# Patient Record
Sex: FEMALE | Race: WHITE | NOT HISPANIC OR LATINO | Employment: FULL TIME | RURAL
[De-identification: names, ages, dates, MRNs, and addresses within clinical notes are randomized per-mention and may not be internally consistent; named-entity substitution may affect disease eponyms.]

---

## 2021-09-09 ENCOUNTER — LAB REQUISITION (OUTPATIENT)
Dept: LAB | Facility: HOSPITAL | Age: 61
End: 2021-09-09
Payer: COMMERCIAL

## 2021-09-09 DIAGNOSIS — Z11.52 ENCOUNTER FOR SCREENING FOR COVID-19: ICD-10-CM

## 2021-09-09 LAB
FLUAV AG UPPER RESP QL IA.RAPID: NEGATIVE
FLUBV AG UPPER RESP QL IA.RAPID: NEGATIVE
SARS-COV+SARS-COV-2 AG RESP QL IA.RAPID: NEGATIVE

## 2021-09-09 PROCEDURE — 87428 SARSCOV & INF VIR A&B AG IA: CPT

## 2021-09-27 ENCOUNTER — CLINICAL SUPPORT (OUTPATIENT)
Dept: PRIMARY CARE CLINIC | Facility: CLINIC | Age: 61
End: 2021-09-27
Payer: COMMERCIAL

## 2021-09-27 DIAGNOSIS — E03.9 HYPOTHYROIDISM, UNSPECIFIED TYPE: Primary | ICD-10-CM

## 2021-09-27 DIAGNOSIS — F32.A DEPRESSION, UNSPECIFIED DEPRESSION TYPE: Primary | ICD-10-CM

## 2021-09-27 RX ORDER — LEVOTHYROXINE SODIUM 100 UG/1
100 TABLET ORAL EVERY MORNING
COMMUNITY
Start: 2021-08-25 | End: 2021-09-27 | Stop reason: SDUPTHER

## 2021-09-27 RX ORDER — FLUOXETINE HYDROCHLORIDE 20 MG/1
20 CAPSULE ORAL EVERY MORNING
COMMUNITY
Start: 2021-08-25 | End: 2021-09-27 | Stop reason: SDUPTHER

## 2021-09-27 RX ORDER — LEVOTHYROXINE SODIUM 100 UG/1
100 TABLET ORAL EVERY MORNING
Qty: 90 TABLET | Refills: 2 | Status: SHIPPED | OUTPATIENT
Start: 2021-09-27 | End: 2022-06-29

## 2021-09-28 RX ORDER — FLUOXETINE HYDROCHLORIDE 20 MG/1
20 CAPSULE ORAL EVERY MORNING
Qty: 90 CAPSULE | Refills: 2 | Status: SHIPPED | OUTPATIENT
Start: 2021-09-28 | End: 2022-06-29

## 2022-04-24 ENCOUNTER — HOSPITAL ENCOUNTER (EMERGENCY)
Facility: HOSPITAL | Age: 62
Discharge: HOME | End: 2022-04-24
Attending: SPECIALIST
Payer: COMMERCIAL

## 2022-04-24 VITALS
RESPIRATION RATE: 20 BRPM | TEMPERATURE: 98 F | HEART RATE: 77 BPM | WEIGHT: 170 LBS | HEIGHT: 65 IN | OXYGEN SATURATION: 97 % | SYSTOLIC BLOOD PRESSURE: 147 MMHG | BODY MASS INDEX: 28.32 KG/M2 | DIASTOLIC BLOOD PRESSURE: 84 MMHG

## 2022-04-24 DIAGNOSIS — S39.012A STRAIN OF LUMBAR REGION, INITIAL ENCOUNTER: Primary | ICD-10-CM

## 2022-04-24 PROCEDURE — 63600175 PHARM REV CODE 636 W HCPCS: Performed by: SPECIALIST

## 2022-04-24 PROCEDURE — 99284 EMERGENCY DEPT VISIT MOD MDM: CPT

## 2022-04-24 PROCEDURE — 99999 HC NO LEVEL OF SERVICE - ED ONLY: CPT

## 2022-04-24 PROCEDURE — 96372 THER/PROPH/DIAG INJ SC/IM: CPT

## 2022-04-24 RX ORDER — KETOROLAC TROMETHAMINE 30 MG/ML
60 INJECTION, SOLUTION INTRAMUSCULAR; INTRAVENOUS
Status: COMPLETED | OUTPATIENT
Start: 2022-04-24 | End: 2022-04-24

## 2022-04-24 RX ORDER — KETOROLAC TROMETHAMINE 30 MG/ML
30 INJECTION, SOLUTION INTRAMUSCULAR; INTRAVENOUS
Status: DISCONTINUED | OUTPATIENT
Start: 2022-04-24 | End: 2022-04-24

## 2022-04-24 RX ADMIN — KETOROLAC TROMETHAMINE 60 MG: 30 INJECTION, SOLUTION INTRAMUSCULAR at 04:04

## 2022-04-24 NOTE — DISCHARGE INSTRUCTIONS
Tiger Balm patches to low back OR warm heat to low back area   Ibuprofen 800 mg every 6 hrs 12 - 24 hrs after Toradol shot only
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
## 2022-04-25 ENCOUNTER — TELEPHONE (OUTPATIENT)
Dept: EMERGENCY MEDICINE | Facility: HOSPITAL | Age: 62
End: 2022-04-25
Payer: COMMERCIAL

## 2022-06-22 ENCOUNTER — TELEPHONE (OUTPATIENT)
Dept: PRIMARY CARE CLINIC | Facility: CLINIC | Age: 62
End: 2022-06-22
Payer: COMMERCIAL

## 2022-06-22 NOTE — TELEPHONE ENCOUNTER
----- Message from Ashley Kovacs sent at 6/21/2022  1:22 PM CDT -----  Regarding: Rx Refill  Request rx for levothyroxine and prozac called to [facility]

## 2022-10-04 ENCOUNTER — OFFICE VISIT (OUTPATIENT)
Dept: PRIMARY CARE CLINIC | Facility: CLINIC | Age: 62
End: 2022-10-04
Payer: COMMERCIAL

## 2022-10-04 ENCOUNTER — TELEPHONE (OUTPATIENT)
Dept: PRIMARY CARE CLINIC | Facility: CLINIC | Age: 62
End: 2022-10-04
Payer: COMMERCIAL

## 2022-10-04 VITALS
HEIGHT: 65 IN | BODY MASS INDEX: 29.99 KG/M2 | DIASTOLIC BLOOD PRESSURE: 72 MMHG | OXYGEN SATURATION: 95 % | HEART RATE: 93 BPM | WEIGHT: 180 LBS | TEMPERATURE: 98 F | RESPIRATION RATE: 20 BRPM | SYSTOLIC BLOOD PRESSURE: 136 MMHG

## 2022-10-04 DIAGNOSIS — Z13.220 ENCOUNTER FOR SCREENING FOR LIPID DISORDER: ICD-10-CM

## 2022-10-04 DIAGNOSIS — F32.A DEPRESSION, UNSPECIFIED DEPRESSION TYPE: ICD-10-CM

## 2022-10-04 DIAGNOSIS — Z79.899 ENCOUNTER FOR LONG-TERM (CURRENT) USE OF MEDICATIONS: ICD-10-CM

## 2022-10-04 DIAGNOSIS — E03.9 HYPOTHYROIDISM, UNSPECIFIED TYPE: Primary | ICD-10-CM

## 2022-10-04 LAB
ALBUMIN SERPL BCP-MCNC: 3.6 G/DL (ref 3.5–5)
ALBUMIN/GLOB SERPL: 1 {RATIO}
ALP SERPL-CCNC: 82 U/L (ref 50–130)
ALT SERPL W P-5'-P-CCNC: 35 U/L (ref 13–56)
ANION GAP SERPL CALCULATED.3IONS-SCNC: 11 MMOL/L (ref 7–16)
AST SERPL W P-5'-P-CCNC: 26 U/L (ref 15–37)
BASOPHILS # BLD AUTO: 0.06 K/UL (ref 0–0.2)
BASOPHILS NFR BLD AUTO: 0.7 % (ref 0–1)
BILIRUB SERPL-MCNC: 0.6 MG/DL (ref ?–1.2)
BUN SERPL-MCNC: 9 MG/DL (ref 7–18)
BUN/CREAT SERPL: 7 (ref 6–20)
CALCIUM SERPL-MCNC: 9 MG/DL (ref 8.5–10.1)
CHLORIDE SERPL-SCNC: 105 MMOL/L (ref 98–107)
CHOLEST SERPL-MCNC: 190 MG/DL (ref 0–200)
CHOLEST/HDLC SERPL: 3.5 {RATIO}
CO2 SERPL-SCNC: 25 MMOL/L (ref 21–32)
CREAT SERPL-MCNC: 1.23 MG/DL (ref 0.55–1.02)
DIFFERENTIAL METHOD BLD: NORMAL
EGFR (NO RACE VARIABLE) (RUSH/TITUS): 50 ML/MIN/1.73M²
EOSINOPHIL # BLD AUTO: 0.19 K/UL (ref 0–0.5)
EOSINOPHIL NFR BLD AUTO: 2.1 % (ref 1–4)
ERYTHROCYTE [DISTWIDTH] IN BLOOD BY AUTOMATED COUNT: 13.2 % (ref 11.5–14.5)
GLOBULIN SER-MCNC: 3.5 G/DL (ref 2–4)
GLUCOSE SERPL-MCNC: 71 MG/DL (ref 74–106)
HCT VFR BLD AUTO: 43.9 % (ref 38–47)
HDLC SERPL-MCNC: 55 MG/DL (ref 40–60)
HGB BLD-MCNC: 14.3 G/DL (ref 12–16)
IMM GRANULOCYTES # BLD AUTO: 0.02 K/UL (ref 0–0.04)
IMM GRANULOCYTES NFR BLD: 0.2 % (ref 0–0.4)
LDLC SERPL CALC-MCNC: 111 MG/DL
LDLC/HDLC SERPL: 2 {RATIO}
LYMPHOCYTES # BLD AUTO: 2.84 K/UL (ref 1–4.8)
LYMPHOCYTES NFR BLD AUTO: 31 % (ref 27–41)
MCH RBC QN AUTO: 30.8 PG (ref 27–31)
MCHC RBC AUTO-ENTMCNC: 32.6 G/DL (ref 32–36)
MCV RBC AUTO: 94.4 FL (ref 80–96)
MONOCYTES # BLD AUTO: 0.53 K/UL (ref 0–0.8)
MONOCYTES NFR BLD AUTO: 5.8 % (ref 2–6)
MPC BLD CALC-MCNC: 11.3 FL (ref 9.4–12.4)
NEUTROPHILS # BLD AUTO: 5.51 K/UL (ref 1.8–7.7)
NEUTROPHILS NFR BLD AUTO: 60.2 % (ref 53–65)
NONHDLC SERPL-MCNC: 135 MG/DL
NRBC # BLD AUTO: 0 X10E3/UL
NRBC, AUTO (.00): 0 %
PLATELET # BLD AUTO: 342 K/UL (ref 150–400)
POTASSIUM SERPL-SCNC: 3.9 MMOL/L (ref 3.5–5.1)
PROT SERPL-MCNC: 7.1 G/DL (ref 6.4–8.2)
RBC # BLD AUTO: 4.65 M/UL (ref 4.2–5.4)
SODIUM SERPL-SCNC: 137 MMOL/L (ref 136–145)
TRIGL SERPL-MCNC: 121 MG/DL (ref 35–150)
TSH SERPL DL<=0.005 MIU/L-ACNC: 5.04 UIU/ML (ref 0.36–3.74)
VLDLC SERPL-MCNC: 24 MG/DL
WBC # BLD AUTO: 9.15 K/UL (ref 4.5–11)

## 2022-10-04 PROCEDURE — 1159F PR MEDICATION LIST DOCUMENTED IN MEDICAL RECORD: ICD-10-PCS | Mod: CPTII,,, | Performed by: FAMILY MEDICINE

## 2022-10-04 PROCEDURE — 3008F BODY MASS INDEX DOCD: CPT | Mod: CPTII,,, | Performed by: FAMILY MEDICINE

## 2022-10-04 PROCEDURE — 80061 LIPID PANEL: ICD-10-PCS | Mod: ,,, | Performed by: CLINICAL MEDICAL LABORATORY

## 2022-10-04 PROCEDURE — 80050 PR  GENERAL HEALTH PANEL: ICD-10-PCS | Mod: ,,, | Performed by: CLINICAL MEDICAL LABORATORY

## 2022-10-04 PROCEDURE — 3078F DIAST BP <80 MM HG: CPT | Mod: CPTII,,, | Performed by: FAMILY MEDICINE

## 2022-10-04 PROCEDURE — 3078F PR MOST RECENT DIASTOLIC BLOOD PRESSURE < 80 MM HG: ICD-10-PCS | Mod: CPTII,,, | Performed by: FAMILY MEDICINE

## 2022-10-04 PROCEDURE — 1159F MED LIST DOCD IN RCRD: CPT | Mod: CPTII,,, | Performed by: FAMILY MEDICINE

## 2022-10-04 PROCEDURE — 99214 OFFICE O/P EST MOD 30 MIN: CPT | Mod: ,,, | Performed by: FAMILY MEDICINE

## 2022-10-04 PROCEDURE — 3075F SYST BP GE 130 - 139MM HG: CPT | Mod: CPTII,,, | Performed by: FAMILY MEDICINE

## 2022-10-04 PROCEDURE — 3008F PR BODY MASS INDEX (BMI) DOCUMENTED: ICD-10-PCS | Mod: CPTII,,, | Performed by: FAMILY MEDICINE

## 2022-10-04 PROCEDURE — 3075F PR MOST RECENT SYSTOLIC BLOOD PRESS GE 130-139MM HG: ICD-10-PCS | Mod: CPTII,,, | Performed by: FAMILY MEDICINE

## 2022-10-04 PROCEDURE — 80050 GENERAL HEALTH PANEL: CPT | Mod: ,,, | Performed by: CLINICAL MEDICAL LABORATORY

## 2022-10-04 PROCEDURE — 80061 LIPID PANEL: CPT | Mod: ,,, | Performed by: CLINICAL MEDICAL LABORATORY

## 2022-10-04 PROCEDURE — 99214 PR OFFICE/OUTPT VISIT, EST, LEVL IV, 30-39 MIN: ICD-10-PCS | Mod: ,,, | Performed by: FAMILY MEDICINE

## 2022-10-04 RX ORDER — FLUOXETINE HYDROCHLORIDE 20 MG/1
20 CAPSULE ORAL EVERY MORNING
Qty: 90 CAPSULE | Refills: 3 | Status: SHIPPED | OUTPATIENT
Start: 2022-10-04 | End: 2023-10-24 | Stop reason: SDUPTHER

## 2022-10-04 RX ORDER — LEVOTHYROXINE SODIUM 100 UG/1
100 TABLET ORAL
Qty: 90 TABLET | Refills: 3 | Status: SHIPPED | OUTPATIENT
Start: 2022-10-04 | End: 2023-02-16 | Stop reason: DRUGHIGH

## 2022-10-04 NOTE — PROGRESS NOTES
Subjective:       Patient ID: Emily Gonzalez is a 61 y.o. female.    Chief Complaint: Hypothyroidism and Medication Refill    Doing well. No complaints voiced.     Medication Refill  Pertinent negatives include no chest pain, congestion, coughing, fatigue, fever, headaches, nausea or vomiting.   Review of Systems   Constitutional:  Negative for fatigue and fever.   HENT:  Negative for nasal congestion and dental problem.    Eyes:  Negative for discharge.   Respiratory:  Negative for cough and shortness of breath.    Cardiovascular:  Negative for chest pain.   Gastrointestinal:  Negative for constipation, diarrhea, nausea and vomiting.   Genitourinary:  Negative for bladder incontinence, difficulty urinating and hot flashes.   Allergic/Immunologic: Negative for environmental allergies.   Neurological:  Negative for headaches.   Psychiatric/Behavioral:  Negative for behavioral problems and confusion.        Objective:      Physical Exam  Vitals and nursing note reviewed.   Constitutional:       Appearance: Normal appearance. She is normal weight.   HENT:      Head: Normocephalic and atraumatic.      Right Ear: Tympanic membrane normal.      Left Ear: Tympanic membrane normal.      Nose: Nose normal.      Mouth/Throat:      Mouth: Mucous membranes are moist.   Eyes:      Extraocular Movements: Extraocular movements intact.      Conjunctiva/sclera: Conjunctivae normal.      Pupils: Pupils are equal, round, and reactive to light.   Cardiovascular:      Rate and Rhythm: Normal rate and regular rhythm.      Pulses: Normal pulses.   Pulmonary:      Effort: Pulmonary effort is normal.      Breath sounds: Normal breath sounds.   Abdominal:      General: Abdomen is flat. Bowel sounds are normal.      Palpations: Abdomen is soft.   Musculoskeletal:         General: Normal range of motion.      Cervical back: Normal range of motion and neck supple.   Skin:     General: Skin is warm and dry.   Neurological:      General: No focal  deficit present.      Mental Status: She is alert and oriented to person, place, and time.   Psychiatric:         Mood and Affect: Mood normal.       Assessment:       Problem List Items Addressed This Visit    None  Visit Diagnoses       Hypothyroidism, unspecified type    -  Primary    Relevant Medications    levothyroxine (SYNTHROID) 100 MCG tablet    Other Relevant Orders    TSH    Encounter for screening for lipid disorder        Relevant Orders    Lipid Panel    Encounter for long-term (current) use of medications        Relevant Orders    CBC Auto Differential    Comprehensive Metabolic Panel              Plan:       RTC as needed  Will call lab results

## 2022-10-04 NOTE — TELEPHONE ENCOUNTER
----- Message from Soni Tijerina sent at 10/3/2022 10:58 AM CDT -----  Regarding: Refill  Refill   Levothroxine  Ferosimide  [facility]

## 2022-10-07 ENCOUNTER — PATIENT MESSAGE (OUTPATIENT)
Dept: PRIMARY CARE CLINIC | Facility: CLINIC | Age: 62
End: 2022-10-07
Payer: COMMERCIAL

## 2022-10-07 ENCOUNTER — TELEPHONE (OUTPATIENT)
Dept: PRIMARY CARE CLINIC | Facility: CLINIC | Age: 62
End: 2022-10-07
Payer: COMMERCIAL

## 2022-10-07 NOTE — TELEPHONE ENCOUNTER
----- Message from Dilcia Waite MD sent at 10/5/2022  8:23 AM CDT -----  Please verify synthroid dose and increase it

## 2022-11-08 ENCOUNTER — INFUSION (OUTPATIENT)
Dept: INFUSION THERAPY | Facility: HOSPITAL | Age: 62
End: 2022-11-08
Attending: SPECIALIST
Payer: COMMERCIAL

## 2022-11-08 VITALS
OXYGEN SATURATION: 98 % | HEIGHT: 66 IN | DIASTOLIC BLOOD PRESSURE: 80 MMHG | BODY MASS INDEX: 28.12 KG/M2 | SYSTOLIC BLOOD PRESSURE: 135 MMHG | RESPIRATION RATE: 16 BRPM | TEMPERATURE: 98 F | WEIGHT: 175 LBS | HEART RATE: 80 BPM

## 2022-11-08 DIAGNOSIS — M54.50 LOW BACK PAIN, UNSPECIFIED BACK PAIN LATERALITY, UNSPECIFIED CHRONICITY, UNSPECIFIED WHETHER SCIATICA PRESENT: Primary | ICD-10-CM

## 2022-11-08 PROCEDURE — 63600175 PHARM REV CODE 636 W HCPCS: Performed by: SPECIALIST

## 2022-11-08 RX ORDER — DEXAMETHASONE SODIUM PHOSPHATE 4 MG/ML
4 INJECTION, SOLUTION INTRA-ARTICULAR; INTRALESIONAL; INTRAMUSCULAR; INTRAVENOUS; SOFT TISSUE ONCE
Status: COMPLETED | OUTPATIENT
Start: 2022-11-08 | End: 2022-11-08

## 2022-11-08 RX ORDER — KETOROLAC TROMETHAMINE 30 MG/ML
60 INJECTION, SOLUTION INTRAMUSCULAR; INTRAVENOUS ONCE
Status: COMPLETED | OUTPATIENT
Start: 2022-11-08 | End: 2022-11-08

## 2022-11-08 RX ADMIN — DEXAMETHASONE SODIUM PHOSPHATE 4 MG: 4 INJECTION, SOLUTION INTRAMUSCULAR; INTRAVENOUS at 08:11

## 2022-11-08 RX ADMIN — KETOROLAC TROMETHAMINE 60 MG: 30 INJECTION, SOLUTION INTRAMUSCULAR at 08:11

## 2023-02-16 RX ORDER — LEVOTHYROXINE SODIUM 125 UG/1
125 TABLET ORAL
Qty: 60 TABLET | Refills: 0 | Status: SHIPPED | OUTPATIENT
Start: 2023-02-16 | End: 2023-04-17

## 2023-02-16 RX ORDER — LEVOTHYROXINE SODIUM 125 UG/1
125 TABLET ORAL
COMMUNITY
End: 2023-02-16 | Stop reason: DRUGHIGH

## 2023-03-09 ENCOUNTER — PATIENT OUTREACH (OUTPATIENT)
Dept: PRIMARY CARE CLINIC | Facility: CLINIC | Age: 63
End: 2023-03-09
Payer: COMMERCIAL

## 2023-03-09 LAB — BCS RECOMMENDATION EXT: NORMAL

## 2023-04-03 ENCOUNTER — OFFICE VISIT (OUTPATIENT)
Dept: PRIMARY CARE CLINIC | Facility: CLINIC | Age: 63
End: 2023-04-03
Payer: COMMERCIAL

## 2023-04-03 VITALS
HEART RATE: 86 BPM | TEMPERATURE: 98 F | OXYGEN SATURATION: 96 % | WEIGHT: 186 LBS | SYSTOLIC BLOOD PRESSURE: 130 MMHG | HEIGHT: 66 IN | DIASTOLIC BLOOD PRESSURE: 84 MMHG | BODY MASS INDEX: 29.89 KG/M2

## 2023-04-03 DIAGNOSIS — M54.50 ACUTE LEFT-SIDED LOW BACK PAIN, UNSPECIFIED WHETHER SCIATICA PRESENT: Primary | ICD-10-CM

## 2023-04-03 DIAGNOSIS — M54.9 DORSALGIA, UNSPECIFIED: ICD-10-CM

## 2023-04-03 DIAGNOSIS — M54.42 ACUTE LEFT-SIDED LOW BACK PAIN WITH LEFT-SIDED SCIATICA: ICD-10-CM

## 2023-04-03 PROCEDURE — 3008F PR BODY MASS INDEX (BMI) DOCUMENTED: ICD-10-PCS | Mod: CPTII,,, | Performed by: FAMILY MEDICINE

## 2023-04-03 PROCEDURE — 96372 THER/PROPH/DIAG INJ SC/IM: CPT | Mod: ,,, | Performed by: FAMILY MEDICINE

## 2023-04-03 PROCEDURE — 1159F MED LIST DOCD IN RCRD: CPT | Mod: CPTII,,, | Performed by: FAMILY MEDICINE

## 2023-04-03 PROCEDURE — 1160F PR REVIEW ALL MEDS BY PRESCRIBER/CLIN PHARMACIST DOCUMENTED: ICD-10-PCS | Mod: CPTII,,, | Performed by: FAMILY MEDICINE

## 2023-04-03 PROCEDURE — 3075F SYST BP GE 130 - 139MM HG: CPT | Mod: CPTII,,, | Performed by: FAMILY MEDICINE

## 2023-04-03 PROCEDURE — 3008F BODY MASS INDEX DOCD: CPT | Mod: CPTII,,, | Performed by: FAMILY MEDICINE

## 2023-04-03 PROCEDURE — 3079F DIAST BP 80-89 MM HG: CPT | Mod: CPTII,,, | Performed by: FAMILY MEDICINE

## 2023-04-03 PROCEDURE — 99213 PR OFFICE/OUTPT VISIT, EST, LEVL III, 20-29 MIN: ICD-10-PCS | Mod: 25,,, | Performed by: FAMILY MEDICINE

## 2023-04-03 PROCEDURE — 3079F PR MOST RECENT DIASTOLIC BLOOD PRESSURE 80-89 MM HG: ICD-10-PCS | Mod: CPTII,,, | Performed by: FAMILY MEDICINE

## 2023-04-03 PROCEDURE — 96372 PR INJECTION,THERAP/PROPH/DIAG2ST, IM OR SUBCUT: ICD-10-PCS | Mod: ,,, | Performed by: FAMILY MEDICINE

## 2023-04-03 PROCEDURE — 1160F RVW MEDS BY RX/DR IN RCRD: CPT | Mod: CPTII,,, | Performed by: FAMILY MEDICINE

## 2023-04-03 PROCEDURE — 1159F PR MEDICATION LIST DOCUMENTED IN MEDICAL RECORD: ICD-10-PCS | Mod: CPTII,,, | Performed by: FAMILY MEDICINE

## 2023-04-03 PROCEDURE — 3075F PR MOST RECENT SYSTOLIC BLOOD PRESS GE 130-139MM HG: ICD-10-PCS | Mod: CPTII,,, | Performed by: FAMILY MEDICINE

## 2023-04-03 PROCEDURE — 99213 OFFICE O/P EST LOW 20 MIN: CPT | Mod: 25,,, | Performed by: FAMILY MEDICINE

## 2023-04-03 RX ORDER — METHOCARBAMOL 500 MG/1
500 TABLET, FILM COATED ORAL 4 TIMES DAILY
Qty: 40 TABLET | Refills: 0 | Status: SHIPPED | OUTPATIENT
Start: 2023-04-03 | End: 2023-04-13

## 2023-04-03 RX ORDER — DEXAMETHASONE SODIUM PHOSPHATE 4 MG/ML
4 INJECTION, SOLUTION INTRA-ARTICULAR; INTRALESIONAL; INTRAMUSCULAR; INTRAVENOUS; SOFT TISSUE
Status: COMPLETED | OUTPATIENT
Start: 2023-04-03 | End: 2023-04-03

## 2023-04-03 RX ORDER — IBUPROFEN 800 MG/1
800 TABLET ORAL 3 TIMES DAILY
Qty: 90 TABLET | Refills: 2 | Status: SHIPPED | OUTPATIENT
Start: 2023-04-03 | End: 2023-10-24 | Stop reason: SDUPTHER

## 2023-04-03 RX ORDER — METHYLPREDNISOLONE ACETATE 80 MG/ML
80 INJECTION, SUSPENSION INTRA-ARTICULAR; INTRALESIONAL; INTRAMUSCULAR; SOFT TISSUE
Status: COMPLETED | OUTPATIENT
Start: 2023-04-03 | End: 2023-04-03

## 2023-04-03 RX ORDER — KETOROLAC TROMETHAMINE 30 MG/ML
60 INJECTION, SOLUTION INTRAMUSCULAR; INTRAVENOUS
Status: COMPLETED | OUTPATIENT
Start: 2023-04-03 | End: 2023-04-03

## 2023-04-03 RX ORDER — PROGESTERONE 200 MG/1
200 CAPSULE ORAL NIGHTLY
COMMUNITY
Start: 2023-03-21

## 2023-04-03 RX ADMIN — DEXAMETHASONE SODIUM PHOSPHATE 4 MG: 4 INJECTION, SOLUTION INTRA-ARTICULAR; INTRALESIONAL; INTRAMUSCULAR; INTRAVENOUS; SOFT TISSUE at 03:04

## 2023-04-03 RX ADMIN — KETOROLAC TROMETHAMINE 60 MG: 30 INJECTION, SOLUTION INTRAMUSCULAR; INTRAVENOUS at 03:04

## 2023-04-03 RX ADMIN — METHYLPREDNISOLONE ACETATE 80 MG: 80 INJECTION, SUSPENSION INTRA-ARTICULAR; INTRALESIONAL; INTRAMUSCULAR; SOFT TISSUE at 03:04

## 2023-04-03 NOTE — PROGRESS NOTES
Subjective     Patient ID: Emily Gonzalez is a 62 y.o. female.    Chief Complaint: Back Pain (Severe low back and left hip pain ongoing for 3 weeks or more. Hurt to stand or sit. Tried ibuprofen,biofreeze,heating pad and tiger balm patches no relief.)    Pt. With low back pain with left sciatic radiation. Pain has been severe. Her  has to help her up. She cannot clean herself in the bathroom without pain. Has been stretching and using OTC meds to no avail. She has never had anything like this.    Back Pain  Pertinent negatives include no bladder incontinence, chest pain, fever or headaches.   Review of Systems   Constitutional:  Negative for fatigue and fever.   HENT:  Negative for nasal congestion and dental problem.    Eyes:  Negative for discharge.   Respiratory:  Negative for cough and shortness of breath.    Cardiovascular:  Negative for chest pain.   Gastrointestinal:  Negative for constipation, diarrhea, nausea and vomiting.   Genitourinary:  Negative for bladder incontinence, difficulty urinating and hot flashes.   Musculoskeletal:  Positive for back pain.   Allergic/Immunologic: Negative for environmental allergies.   Neurological:  Negative for headaches.   Psychiatric/Behavioral:  Negative for behavioral problems and confusion.         Objective     Physical Exam  Vitals and nursing note reviewed.   Constitutional:       Appearance: Normal appearance. She is normal weight.   HENT:      Head: Normocephalic and atraumatic.      Right Ear: Tympanic membrane normal.      Left Ear: Tympanic membrane normal.      Nose: Nose normal.      Mouth/Throat:      Mouth: Mucous membranes are moist.   Eyes:      Extraocular Movements: Extraocular movements intact.      Conjunctiva/sclera: Conjunctivae normal.      Pupils: Pupils are equal, round, and reactive to light.   Cardiovascular:      Rate and Rhythm: Normal rate and regular rhythm.      Pulses: Normal pulses.   Pulmonary:      Effort: Pulmonary effort is  normal.      Breath sounds: Normal breath sounds.   Abdominal:      General: Abdomen is flat. Bowel sounds are normal.      Palpations: Abdomen is soft.   Musculoskeletal:         General: Normal range of motion.      Cervical back: Normal range of motion and neck supple.      Comments: Low back pain with left sciatica   Skin:     General: Skin is warm and dry.   Neurological:      General: No focal deficit present.      Mental Status: She is alert and oriented to person, place, and time.      Comments: Left sciatica   Psychiatric:         Mood and Affect: Mood normal.          Assessment and Plan     Problem List Items Addressed This Visit          Orthopedic    Acute left-sided low back pain with left-sided sciatica    Relevant Medications    dexAMETHasone injection 4 mg (Start on 4/3/2023  3:00 PM)    methylPREDNISolone acetate injection 80 mg (Start on 4/3/2023  3:00 PM)    ketorolac injection 60 mg (Start on 4/3/2023  3:00 PM)    methocarbamoL (ROBAXIN) 500 MG Tab    ibuprofen (ADVIL,MOTRIN) 800 MG tablet    Other Relevant Orders    MRI Lumbar Spine Without Contrast     Other Visit Diagnoses       Acute left-sided low back pain, unspecified whether sciatica present    -  Primary    Relevant Orders    X-Ray Lumbar Spine Ap And Lateral    Dorsalgia, unspecified        Relevant Orders    MRI Lumbar Spine Without Contrast            X-ray demonstrates loss of normal lordosis; arthritic changes are present  Needs a MRI

## 2023-04-17 ENCOUNTER — LAB VISIT (OUTPATIENT)
Dept: LAB | Facility: HOSPITAL | Age: 63
End: 2023-04-17
Attending: FAMILY MEDICINE
Payer: COMMERCIAL

## 2023-04-17 ENCOUNTER — TELEPHONE (OUTPATIENT)
Dept: PRIMARY CARE CLINIC | Facility: CLINIC | Age: 63
End: 2023-04-17
Payer: COMMERCIAL

## 2023-04-17 DIAGNOSIS — E03.9 HYPOTHYROIDISM, UNSPECIFIED TYPE: ICD-10-CM

## 2023-04-17 DIAGNOSIS — E03.9 HYPOTHYROIDISM, UNSPECIFIED TYPE: Primary | ICD-10-CM

## 2023-04-17 LAB — TSH SERPL DL<=0.005 MIU/L-ACNC: 0.48 UIU/ML (ref 0.36–3.74)

## 2023-04-17 PROCEDURE — 36415 COLL VENOUS BLD VENIPUNCTURE: CPT

## 2023-04-17 PROCEDURE — 84443 ASSAY THYROID STIM HORMONE: CPT

## 2023-04-18 ENCOUNTER — HOSPITAL ENCOUNTER (OUTPATIENT)
Dept: RADIOLOGY | Facility: HOSPITAL | Age: 63
Discharge: HOME | End: 2023-04-18
Attending: FAMILY MEDICINE
Payer: COMMERCIAL

## 2023-04-18 DIAGNOSIS — M54.42 ACUTE LEFT-SIDED LOW BACK PAIN WITH LEFT-SIDED SCIATICA: ICD-10-CM

## 2023-04-18 DIAGNOSIS — M54.9 DORSALGIA, UNSPECIFIED: ICD-10-CM

## 2023-04-18 PROCEDURE — 72148 MRI LUMBAR SPINE W/O DYE: CPT | Mod: TC

## 2023-04-21 ENCOUNTER — TELEPHONE (OUTPATIENT)
Dept: SPINE | Facility: CLINIC | Age: 63
End: 2023-04-21
Payer: COMMERCIAL

## 2023-04-21 DIAGNOSIS — M54.16 LUMBAR RADICULOPATHY: Primary | ICD-10-CM

## 2023-04-21 NOTE — TELEPHONE ENCOUNTER
CALLED PATIENT ON 4/21 @9:35AM. PATIENT HAS A REFERRAL IN [facility] FROM [facility] FOR LUMBAR RADICULOPATHY. NO ANSWER, LEFT MESSAGE.

## 2023-05-03 ENCOUNTER — HOSPITAL ENCOUNTER (OUTPATIENT)
Dept: RADIOLOGY | Facility: HOSPITAL | Age: 63
Discharge: HOME | End: 2023-05-03
Attending: NURSE PRACTITIONER
Payer: COMMERCIAL

## 2023-05-03 ENCOUNTER — OFFICE VISIT (OUTPATIENT)
Dept: SPINE | Facility: CLINIC | Age: 63
End: 2023-05-03
Payer: COMMERCIAL

## 2023-05-03 VITALS — HEIGHT: 66 IN | WEIGHT: 180 LBS | BODY MASS INDEX: 28.93 KG/M2

## 2023-05-03 DIAGNOSIS — M54.16 LUMBAR RADICULOPATHY: Primary | ICD-10-CM

## 2023-05-03 DIAGNOSIS — M54.16 LUMBAR RADICULOPATHY: ICD-10-CM

## 2023-05-03 PROCEDURE — 99204 OFFICE O/P NEW MOD 45 MIN: CPT | Mod: S$PBB,,, | Performed by: NURSE PRACTITIONER

## 2023-05-03 PROCEDURE — 3008F PR BODY MASS INDEX (BMI) DOCUMENTED: ICD-10-PCS | Mod: ,,, | Performed by: NURSE PRACTITIONER

## 2023-05-03 PROCEDURE — 99214 OFFICE O/P EST MOD 30 MIN: CPT | Mod: PBBFAC | Performed by: NURSE PRACTITIONER

## 2023-05-03 PROCEDURE — 72110 X-RAY EXAM L-2 SPINE 4/>VWS: CPT | Mod: TC

## 2023-05-03 PROCEDURE — 72110 XR LUMBAR SPINE 4-5 VIEW WITH BENDING VIEWS: ICD-10-PCS | Mod: 26,,, | Performed by: STUDENT IN AN ORGANIZED HEALTH CARE EDUCATION/TRAINING PROGRAM

## 2023-05-03 PROCEDURE — 99204 PR OFFICE/OUTPT VISIT, NEW, LEVL IV, 45-59 MIN: ICD-10-PCS | Mod: S$PBB,,, | Performed by: NURSE PRACTITIONER

## 2023-05-03 PROCEDURE — 72110 X-RAY EXAM L-2 SPINE 4/>VWS: CPT | Mod: 26,,, | Performed by: STUDENT IN AN ORGANIZED HEALTH CARE EDUCATION/TRAINING PROGRAM

## 2023-05-03 PROCEDURE — 1159F MED LIST DOCD IN RCRD: CPT | Mod: ,,, | Performed by: NURSE PRACTITIONER

## 2023-05-03 PROCEDURE — 3008F BODY MASS INDEX DOCD: CPT | Mod: ,,, | Performed by: NURSE PRACTITIONER

## 2023-05-03 PROCEDURE — 1159F PR MEDICATION LIST DOCUMENTED IN MEDICAL RECORD: ICD-10-PCS | Mod: ,,, | Performed by: NURSE PRACTITIONER

## 2023-05-03 RX ORDER — GABAPENTIN 100 MG/1
100 CAPSULE ORAL 3 TIMES DAILY
Qty: 90 CAPSULE | Refills: 11 | Status: SHIPPED | OUTPATIENT
Start: 2023-05-03 | End: 2024-05-02

## 2023-05-03 NOTE — PROGRESS NOTES
MDM/time:  Greater than 45 minutes spent on this encounter including 15 minutes reviewing imaging and notes, 20 minutes with the patient, 10 minutes documentation    ASSESSMENT:  62 y.o. female with lumbar spondylolisthesis at L4-5 and L5-S1 with radiculopathy    PLAN:  Physical therapy lumbar spine  Neurontin 100 mg 1 tablet 3 times a day  Referral to total pain care to this discuss epidural injections  Follow-up in 3 months    HPI:  62 y.o. female here for evaluation of low back pain that radiates into the left buttocks down the side of the left leg stopping mid calf on the left lower extremity.  Patient reports she has had some back pain that started after a trip to [place] walking a lot and riding roller coasters approximally 2 and half months ago pain increased and now radiating down her leg.  She reports increased pain with sitting for a long period of time or standing for a long period of time.  Walking seems to improve her pain.  No difficulty with  strength.  No issues with balance or falls.  She has had a few trips and stumbles.  No bladder or bowel incontinence.  Decreased walking tolerance over time.  Currently taking Robaxin, ibuprofen and has had a steroid IM injection and a steroid Dosepak which did give her some relief of pain.  No recent physical therapy.  No prior pain management.  Recent MRI 04/03/2023.  No prior spine surgery.  Patient is not a smoker.    IMAGING:  X-Ray Lumbar 4-5 View including Bending Views  Narrative: EXAMINATION:  XR LUMBAR SPINE 4-5 VIEW WITH BENDING VIEWS    CLINICAL HISTORY:  .  Radiculopathy, lumbar region    TECHNIQUE:  XR LUMBAR SPINE 4-5 VIEW WITH BENDING VIEWS    COMPARISON:  04/03/2023    FINDINGS:  No acute fracture.    No malalignment.    Mild multilevel degenerative disc disease.  Mild multilevel facet change.  Neural foraminal narrowing involving L4-5 and L5-S1.  Impression: Similar multilevel degenerative change relative to 1 month  ago.    Electronically signed by: Malik Kelsey  Date:    05/03/2023  Time:    14:40         EXAMINATION:  MRI LUMBAR SPINE WITHOUT CONTRAST     CLINICAL HISTORY:  Lumbago with sciatica, left sideLow back pain, progressive neurologic deficit;     COMPARISON:  None     TECHNIQUE:  Multiplanar MRI imaging of the lumbar spine was performed without the use of intravenous contrast.     FINDINGS:  Lumbar vertebral body heights and alignment appear maintained.  Multilevel mild marginal vertebral body osteophyte formation.  Multilevel disc desiccation.     Intervertebral disc space levels:     L1-L2: No significant disc bulge, neuroforaminal narrowing or spinal canal stenosis.     L2-L3: Moderate loss of disc space height.  Diffuse disc bulge with posterior facet and ligamentum flavum hypertrophy. Moderate spinal canal narrowing. Mild-to-moderate right and mild-to-moderate left neuroforaminal narrowing.     L3-L4: Mild-to-moderate loss of disc space height.  Diffuse disc bulge with posterior facet and ligamentum flavum hypertrophy. Moderate spinal canal narrowing. Mild-to-moderate right and moderate left neuroforaminal narrowing.     L4-L5: Moderate loss of disc space height.  Diffuse disc bulge with posterior facet and ligamentum flavum hypertrophy. Moderate spinal canal narrowing. Moderate right and mild-to-moderate left neuroforaminal narrowing.     L5-S1: Moderate loss of disc space height.  Diffuse disc bulge with posterior facet and ligamentum flavum hypertrophy. Mild spinal canal narrowing. Mild right and mild left neuroforaminal narrowing.     Impression:     Degenerative change of the lumbar spine with spinal canal and neuroforaminal narrowing as detailed above.  Moderate spinal canal and neural foraminal narrowing noted at several locations.     Point of Service: [facility]        Electronically signed by: Ramesh Alcantara  Date:                                            04/18/2023  Time:                                              Past Medical History:   Diagnosis Date    Hypothyroidism      Past Surgical History:   Procedure Laterality Date    BREAST SURGERY      CHOLECYSTECTOMY      HYSTERECTOMY       Social History     Tobacco Use    Smoking status: Never    Smokeless tobacco: Never   Substance Use Topics    Alcohol use: Not Currently    Drug use: Never      Current Outpatient Medications   Medication Instructions    FLUoxetine 20 mg, Oral, Every morning    ibuprofen (ADVIL,MOTRIN) 800 mg, Oral, 3 times daily    levothyroxine (SYNTHROID) 125 mcg, Oral, Before breakfast    progesterone (PROMETRIUM) 200 mg, Oral, Nightly        EXAM:  Physical Exam   Constitutional  General Appearance:  There is no height or weight on file to calculate BMI., NAD  Psychiatric   Orientation: Oriented to time, oriented to place, oriented to person  Mood and Affect: Active and alert, normal mood, normal affect  Gait and Station   Appearance:  Normal gait, normal tandem gait, able to walk on toes, able to walk on heels    LUMBAR  Musculoskeletal System   Hips: Normal appearance, no leg length discrepancy, normal motion; left, normal motion; right    Lumbar Spine                   Inspection:  Normal alignment, normal sagittal balance                  Range of motion:  Decreased flexion, extension, lateral bending, rotation. Pain with range of motion                  Bony Palpation of the Lumbar Spine:  No tenderness of the spinous process, no tenderness of the sacrum, no tenderness of the coccyx                  Bony Palpation of the Right Hip:  No tenderness of the iliac crest, no tenderness of the sciatic notch, no tenderness of the SI joint                  Bony Palpation of the Left Hip:  No tenderness of the iliac crest, no tenderness of the sciatic notch, no tenderness of the SI joint                  Soft Tissue Palpation on the Right:  No tenderness of the paraspinal region, no tenderness of the iliolumbar region                   Soft Tissue Palpation on the Left:  No tenderness of the paraspinal region, no tenderness of the iliolumbar region    Motor Strength   L1 Right:  Hip flexion iliopsoas 5/5    L1 Left:  Hip flexion iliopsoas 4/5              L2-L4 Right:  Knee extension quadriceps 5/5, tibialis anterior 5/5              L2-L4 Left:  Knee extension quadriceps 4/5, tibialis anterior 5/5   L5 Right:  Extensor hallucis llongus 5/5,    L5 Left:  Extensor hallucis longus 5/5,    S1 Right:  Plantar flexion gastrocnemius 5/5   S1 Left:  Plantar flexion gastrocnemius 5/5    Neurological System   Ankle Reflex Right:  normal   Ankle Reflex Left: normal   Knee Reflex Right:  normal   Knee Reflex Left:  normal   Sensation on the Right:  L2 normal, L3 normal, L4 normal, L5 normal, S1 normal   Sensation on the Left:  L2 normal, L3 normal, L4 normal, L5 normal, S1 normal              Special Test on the Right:  Seated straight leg raising test negative, no clonus of the ankle              Special Test on the Left:  Seated straight leg raising test negative, no clonus of the ankle    Skin   Lumbosacral Spine:  Normal skin    Cardiovascular System   Arterial Pulses Right:  Posterior tibialis normal, dorsalis pedis normal   Arterial Pulses Left:  Posterior tibialis normal, dorsalis pedis normal   Edema Right: None   Edema Left:  None

## 2023-05-24 DIAGNOSIS — E03.9 HYPOTHYROIDISM, UNSPECIFIED TYPE: Primary | ICD-10-CM

## 2023-05-24 RX ORDER — LEVOTHYROXINE SODIUM 125 UG/1
TABLET ORAL
Qty: 30 TABLET | Refills: 5 | Status: SHIPPED | OUTPATIENT
Start: 2023-05-24 | End: 2023-10-24 | Stop reason: SDUPTHER

## 2023-06-26 ENCOUNTER — HOSPITAL ENCOUNTER (OUTPATIENT)
Dept: RADIOLOGY | Facility: HOSPITAL | Age: 63
Discharge: HOME | End: 2023-06-26
Payer: COMMERCIAL

## 2023-06-26 DIAGNOSIS — R76.11 POSITIVE TB TEST: ICD-10-CM

## 2023-06-26 PROCEDURE — 71046 X-RAY EXAM CHEST 2 VIEWS: CPT | Mod: TC

## 2023-09-21 PROCEDURE — 88305 TISSUE EXAM BY PATHOLOGIST: CPT | Mod: TC,SUR | Performed by: DERMATOLOGY

## 2023-09-21 PROCEDURE — 88305 TISSUE EXAM BY PATHOLOGIST: CPT | Mod: 26,,, | Performed by: PATHOLOGY

## 2023-09-21 PROCEDURE — 88305 PATHOLOGY, DERMATOLOGY: ICD-10-PCS | Mod: 26,,, | Performed by: PATHOLOGY

## 2023-09-22 ENCOUNTER — LAB REQUISITION (OUTPATIENT)
Dept: LAB | Facility: HOSPITAL | Age: 63
End: 2023-09-22
Attending: DERMATOLOGY
Payer: COMMERCIAL

## 2023-09-22 DIAGNOSIS — D49.2 NEOPLASM OF UNSPECIFIED BEHAVIOR OF BONE, SOFT TISSUE, AND SKIN: ICD-10-CM

## 2023-09-25 LAB
ESTROGEN SERPL-MCNC: NORMAL PG/ML
INSULIN SERPL-ACNC: NORMAL U[IU]/ML
LAB AP GROSS DESCRIPTION: NORMAL
LAB AP LABORATORY NOTES: NORMAL
LAB AP SPEC A DDX: NORMAL
LAB AP SPEC A MORPHOLOGY: NORMAL
LAB AP SPEC A PROCEDURE: NORMAL
T3RU NFR SERPL: NORMAL %

## 2023-10-23 ENCOUNTER — TELEPHONE (OUTPATIENT)
Dept: PRIMARY CARE CLINIC | Facility: CLINIC | Age: 63
End: 2023-10-23
Payer: COMMERCIAL

## 2023-10-24 ENCOUNTER — OFFICE VISIT (OUTPATIENT)
Dept: PRIMARY CARE CLINIC | Facility: CLINIC | Age: 63
End: 2023-10-24
Payer: COMMERCIAL

## 2023-10-24 VITALS
SYSTOLIC BLOOD PRESSURE: 104 MMHG | TEMPERATURE: 98 F | BODY MASS INDEX: 27 KG/M2 | OXYGEN SATURATION: 96 % | DIASTOLIC BLOOD PRESSURE: 72 MMHG | WEIGHT: 168 LBS | HEART RATE: 89 BPM | HEIGHT: 66 IN

## 2023-10-24 DIAGNOSIS — E03.9 HYPOTHYROIDISM, UNSPECIFIED TYPE: Primary | ICD-10-CM

## 2023-10-24 DIAGNOSIS — M54.42 ACUTE LEFT-SIDED LOW BACK PAIN WITH LEFT-SIDED SCIATICA: ICD-10-CM

## 2023-10-24 DIAGNOSIS — F32.A DEPRESSION, UNSPECIFIED DEPRESSION TYPE: ICD-10-CM

## 2023-10-24 DIAGNOSIS — Z13.220 ENCOUNTER FOR SCREENING FOR LIPID DISORDER: ICD-10-CM

## 2023-10-24 DIAGNOSIS — Z79.899 ENCOUNTER FOR LONG-TERM (CURRENT) USE OF MEDICATIONS: ICD-10-CM

## 2023-10-24 LAB
ALBUMIN SERPL BCP-MCNC: 3.6 G/DL (ref 3.5–5)
ALBUMIN/GLOB SERPL: 1 {RATIO}
ALP SERPL-CCNC: 83 U/L (ref 50–130)
ALT SERPL W P-5'-P-CCNC: 35 U/L (ref 13–56)
ANION GAP SERPL CALCULATED.3IONS-SCNC: 7 MMOL/L (ref 7–16)
AST SERPL W P-5'-P-CCNC: 23 U/L (ref 15–37)
BASOPHILS # BLD AUTO: 0.05 K/UL (ref 0–0.2)
BASOPHILS NFR BLD AUTO: 0.7 % (ref 0–1)
BILIRUB SERPL-MCNC: 0.5 MG/DL (ref ?–1.2)
BUN SERPL-MCNC: 9 MG/DL (ref 7–18)
BUN/CREAT SERPL: 9 (ref 6–20)
CALCIUM SERPL-MCNC: 9.3 MG/DL (ref 8.5–10.1)
CHLORIDE SERPL-SCNC: 109 MMOL/L (ref 98–107)
CHOLEST SERPL-MCNC: 188 MG/DL (ref 0–200)
CHOLEST/HDLC SERPL: 3.5 {RATIO}
CO2 SERPL-SCNC: 27 MMOL/L (ref 21–32)
CREAT SERPL-MCNC: 1.05 MG/DL (ref 0.55–1.02)
DIFFERENTIAL METHOD BLD: ABNORMAL
EGFR (NO RACE VARIABLE) (RUSH/TITUS): 60 ML/MIN/1.73M2
EOSINOPHIL # BLD AUTO: 0.16 K/UL (ref 0–0.5)
EOSINOPHIL NFR BLD AUTO: 2.1 % (ref 1–4)
ERYTHROCYTE [DISTWIDTH] IN BLOOD BY AUTOMATED COUNT: 12.8 % (ref 11.5–14.5)
GLOBULIN SER-MCNC: 3.6 G/DL (ref 2–4)
GLUCOSE SERPL-MCNC: 113 MG/DL (ref 74–106)
HCT VFR BLD AUTO: 44.4 % (ref 38–47)
HDLC SERPL-MCNC: 54 MG/DL (ref 40–60)
HGB BLD-MCNC: 14.7 G/DL (ref 12–16)
IMM GRANULOCYTES # BLD AUTO: 0.02 K/UL (ref 0–0.04)
IMM GRANULOCYTES NFR BLD: 0.3 % (ref 0–0.4)
LDLC SERPL CALC-MCNC: 115 MG/DL
LDLC/HDLC SERPL: 2.1 {RATIO}
LYMPHOCYTES # BLD AUTO: 2.55 K/UL (ref 1–4.8)
LYMPHOCYTES NFR BLD AUTO: 34 % (ref 27–41)
MCH RBC QN AUTO: 30.2 PG (ref 27–31)
MCHC RBC AUTO-ENTMCNC: 33.1 G/DL (ref 32–36)
MCV RBC AUTO: 91.4 FL (ref 80–96)
MONOCYTES # BLD AUTO: 0.49 K/UL (ref 0–0.8)
MONOCYTES NFR BLD AUTO: 6.5 % (ref 2–6)
MPC BLD CALC-MCNC: 11.6 FL (ref 9.4–12.4)
NEUTROPHILS # BLD AUTO: 4.23 K/UL (ref 1.8–7.7)
NEUTROPHILS NFR BLD AUTO: 56.4 % (ref 53–65)
NONHDLC SERPL-MCNC: 134 MG/DL
NRBC # BLD AUTO: 0 X10E3/UL
NRBC, AUTO (.00): 0 %
PLATELET # BLD AUTO: 321 K/UL (ref 150–400)
POTASSIUM SERPL-SCNC: 3.9 MMOL/L (ref 3.5–5.1)
PROT SERPL-MCNC: 7.2 G/DL (ref 6.4–8.2)
RBC # BLD AUTO: 4.86 M/UL (ref 4.2–5.4)
SODIUM SERPL-SCNC: 139 MMOL/L (ref 136–145)
TRIGL SERPL-MCNC: 95 MG/DL (ref 35–150)
TSH SERPL DL<=0.005 MIU/L-ACNC: 0.01 UIU/ML (ref 0.36–3.74)
VLDLC SERPL-MCNC: 19 MG/DL
WBC # BLD AUTO: 7.5 K/UL (ref 4.5–11)

## 2023-10-24 PROCEDURE — 84481 T3, FREE: ICD-10-PCS | Mod: ,,, | Performed by: CLINICAL MEDICAL LABORATORY

## 2023-10-24 PROCEDURE — 84436 ASSAY OF TOTAL THYROXINE: CPT | Mod: ,,, | Performed by: CLINICAL MEDICAL LABORATORY

## 2023-10-24 PROCEDURE — 1159F MED LIST DOCD IN RCRD: CPT | Mod: CPTII,,, | Performed by: FAMILY MEDICINE

## 2023-10-24 PROCEDURE — 84436 T4: ICD-10-PCS | Mod: ,,, | Performed by: CLINICAL MEDICAL LABORATORY

## 2023-10-24 PROCEDURE — 3074F SYST BP LT 130 MM HG: CPT | Mod: CPTII,,, | Performed by: FAMILY MEDICINE

## 2023-10-24 PROCEDURE — 1160F RVW MEDS BY RX/DR IN RCRD: CPT | Mod: CPTII,,, | Performed by: FAMILY MEDICINE

## 2023-10-24 PROCEDURE — 3008F PR BODY MASS INDEX (BMI) DOCUMENTED: ICD-10-PCS | Mod: CPTII,,, | Performed by: FAMILY MEDICINE

## 2023-10-24 PROCEDURE — 3074F PR MOST RECENT SYSTOLIC BLOOD PRESSURE < 130 MM HG: ICD-10-PCS | Mod: CPTII,,, | Performed by: FAMILY MEDICINE

## 2023-10-24 PROCEDURE — 80061 LIPID PANEL: CPT | Mod: ,,, | Performed by: CLINICAL MEDICAL LABORATORY

## 2023-10-24 PROCEDURE — 99214 PR OFFICE/OUTPT VISIT, EST, LEVL IV, 30-39 MIN: ICD-10-PCS | Mod: ,,, | Performed by: FAMILY MEDICINE

## 2023-10-24 PROCEDURE — 1160F PR REVIEW ALL MEDS BY PRESCRIBER/CLIN PHARMACIST DOCUMENTED: ICD-10-PCS | Mod: CPTII,,, | Performed by: FAMILY MEDICINE

## 2023-10-24 PROCEDURE — 3008F BODY MASS INDEX DOCD: CPT | Mod: CPTII,,, | Performed by: FAMILY MEDICINE

## 2023-10-24 PROCEDURE — 80061 LIPID PANEL: ICD-10-PCS | Mod: ,,, | Performed by: CLINICAL MEDICAL LABORATORY

## 2023-10-24 PROCEDURE — 1159F PR MEDICATION LIST DOCUMENTED IN MEDICAL RECORD: ICD-10-PCS | Mod: CPTII,,, | Performed by: FAMILY MEDICINE

## 2023-10-24 PROCEDURE — 80050 PR  GENERAL HEALTH PANEL: ICD-10-PCS | Mod: ,,, | Performed by: CLINICAL MEDICAL LABORATORY

## 2023-10-24 PROCEDURE — 3078F PR MOST RECENT DIASTOLIC BLOOD PRESSURE < 80 MM HG: ICD-10-PCS | Mod: CPTII,,, | Performed by: FAMILY MEDICINE

## 2023-10-24 PROCEDURE — 84481 FREE ASSAY (FT-3): CPT | Mod: ,,, | Performed by: CLINICAL MEDICAL LABORATORY

## 2023-10-24 PROCEDURE — 99214 OFFICE O/P EST MOD 30 MIN: CPT | Mod: ,,, | Performed by: FAMILY MEDICINE

## 2023-10-24 PROCEDURE — 3078F DIAST BP <80 MM HG: CPT | Mod: CPTII,,, | Performed by: FAMILY MEDICINE

## 2023-10-24 PROCEDURE — 80050 GENERAL HEALTH PANEL: CPT | Mod: ,,, | Performed by: CLINICAL MEDICAL LABORATORY

## 2023-10-24 RX ORDER — PREGABALIN 50 MG/1
50 CAPSULE ORAL 2 TIMES DAILY
COMMUNITY
Start: 2023-09-21

## 2023-10-24 RX ORDER — IBUPROFEN 800 MG/1
800 TABLET ORAL 3 TIMES DAILY
Qty: 90 TABLET | Refills: 2 | Status: SHIPPED | OUTPATIENT
Start: 2023-10-24

## 2023-10-24 RX ORDER — FLUOXETINE HYDROCHLORIDE 20 MG/1
20 CAPSULE ORAL EVERY MORNING
Qty: 90 CAPSULE | Refills: 3 | Status: SHIPPED | OUTPATIENT
Start: 2023-10-24

## 2023-10-24 RX ORDER — LEVOTHYROXINE SODIUM 125 UG/1
TABLET ORAL
Qty: 90 TABLET | Refills: 3 | Status: SHIPPED | OUTPATIENT
Start: 2023-10-24

## 2023-10-24 RX ORDER — PREGABALIN 50 MG/1
50 CAPSULE ORAL 2 TIMES DAILY
Qty: 60 CAPSULE | Refills: 2 | Status: CANCELLED | OUTPATIENT
Start: 2023-10-24

## 2023-10-24 RX ORDER — HYDROCODONE BITARTRATE AND ACETAMINOPHEN 10; 325 MG/1; MG/1
1 TABLET ORAL 2 TIMES DAILY PRN
COMMUNITY
Start: 2023-09-21

## 2023-10-24 RX ORDER — PROGESTERONE 200 MG/1
200 CAPSULE ORAL NIGHTLY
Qty: 30 CAPSULE | Refills: 2 | Status: CANCELLED | OUTPATIENT
Start: 2023-10-24

## 2023-10-24 NOTE — PROGRESS NOTES
Subjective     Patient ID: Emily Gonzalez is a 62 y.o. female.    Chief Complaint: Medication Refill and Back Pain (Consult about back pain.C/O Now seeing Dr. Quinonez at the pain clinic.)    Pt. Gets prometrium from Dr. Calvillo; she has had multiple spinal procedures - the last being a nerve burning. She is doing well so far.    Medication Refill  Pertinent negatives include no chest pain, congestion, coughing, fatigue, fever, headaches, nausea or vomiting.   Back Pain  Pertinent negatives include no bladder incontinence, chest pain, fever or headaches.     Review of Systems   Constitutional:  Negative for fatigue and fever.   HENT:  Negative for nasal congestion and dental problem.    Eyes:  Negative for discharge.   Respiratory:  Negative for cough and shortness of breath.    Cardiovascular:  Negative for chest pain.   Gastrointestinal:  Negative for constipation, diarrhea, nausea and vomiting.   Genitourinary:  Negative for bladder incontinence, difficulty urinating and hot flashes.   Musculoskeletal:  Positive for back pain.   Allergic/Immunologic: Negative for environmental allergies.   Neurological:  Negative for headaches.   Psychiatric/Behavioral:  Negative for behavioral problems and confusion.           Objective     Physical Exam  Vitals and nursing note reviewed.   Constitutional:       Appearance: Normal appearance. She is normal weight.   HENT:      Head: Normocephalic and atraumatic.      Right Ear: Tympanic membrane normal.      Left Ear: Tympanic membrane normal.      Nose: Nose normal.      Mouth/Throat:      Mouth: Mucous membranes are moist.   Eyes:      Extraocular Movements: Extraocular movements intact.      Conjunctiva/sclera: Conjunctivae normal.      Pupils: Pupils are equal, round, and reactive to light.   Cardiovascular:      Rate and Rhythm: Normal rate and regular rhythm.      Pulses: Normal pulses.   Pulmonary:      Effort: Pulmonary effort is normal.      Breath sounds: Normal  breath sounds.   Abdominal:      General: Abdomen is flat. Bowel sounds are normal.      Palpations: Abdomen is soft.   Musculoskeletal:         General: Normal range of motion.      Cervical back: Normal range of motion and neck supple.   Skin:     General: Skin is warm and dry.   Neurological:      General: No focal deficit present.      Mental Status: She is alert and oriented to person, place, and time.   Psychiatric:         Mood and Affect: Mood normal.            Assessment and Plan     1. Hypothyroidism, unspecified type  -     TSH; Future; Expected date: 10/24/2023  -     levothyroxine (SYNTHROID) 125 MCG tablet; TAKE 1 TABLET EVERY MORNING 30 MINUTES PRIOR TO BREAKFAST MEAL  Dispense: 90 tablet; Refill: 3    2. Encounter for screening for lipid disorder  -     Lipid Panel; Future; Expected date: 10/24/2023    3. Encounter for long-term (current) use of medications  -     CBC Auto Differential; Future; Expected date: 10/24/2023  -     Comprehensive Metabolic Panel; Future; Expected date: 10/24/2023    4. Acute left-sided low back pain with left-sided sciatica  -     ibuprofen (ADVIL,MOTRIN) 800 MG tablet; Take 1 tablet (800 mg total) by mouth 3 (three) times daily.  Dispense: 90 tablet; Refill: 2    5. Depression, unspecified depression type  -     FLUoxetine 20 MG capsule; Take 1 capsule (20 mg total) by mouth every morning.  Dispense: 90 capsule; Refill: 3        RTC as needed         No follow-ups on file.

## 2023-10-25 ENCOUNTER — TELEPHONE (OUTPATIENT)
Dept: PRIMARY CARE CLINIC | Facility: CLINIC | Age: 63
End: 2023-10-25
Payer: COMMERCIAL

## 2023-10-25 LAB
T3FREE SERPL-MCNC: 3.06 PG/ML (ref 2.18–3.98)
T4 SERPL-MCNC: 13.6 ΜG/DL (ref 4.8–13.9)

## 2023-10-25 NOTE — TELEPHONE ENCOUNTER
----- Message from Dilcia Waite MD sent at 10/25/2023  8:31 AM CDT -----  Add a T3 and T4. Hold all thyroid medication.

## 2023-10-27 ENCOUNTER — TELEPHONE (OUTPATIENT)
Dept: PRIMARY CARE CLINIC | Facility: CLINIC | Age: 63
End: 2023-10-27
Payer: COMMERCIAL

## 2023-10-27 RX ORDER — LEVOTHYROXINE SODIUM 100 UG/1
100 TABLET ORAL
COMMUNITY
End: 2024-01-24 | Stop reason: SDUPTHER

## 2023-10-27 NOTE — TELEPHONE ENCOUNTER
----- Message from Dilcia Waite MD sent at 10/25/2023  3:16 PM CDT -----  Lower synthroid to 100mcg

## 2024-01-03 ENCOUNTER — PATIENT MESSAGE (OUTPATIENT)
Dept: ADMINISTRATIVE | Facility: HOSPITAL | Age: 64
End: 2024-01-03

## 2024-01-04 DIAGNOSIS — Z12.11 SCREENING FOR COLON CANCER: ICD-10-CM

## 2024-01-24 DIAGNOSIS — E03.9 HYPOTHYROIDISM, UNSPECIFIED TYPE: Primary | ICD-10-CM

## 2024-01-24 RX ORDER — LEVOTHYROXINE SODIUM 100 UG/1
100 TABLET ORAL
Qty: 90 TABLET | Refills: 1 | Status: SHIPPED | OUTPATIENT
Start: 2024-01-24 | End: 2025-01-23

## 2024-01-25 LAB — HEMOCCULT STL QL IA: NEGATIVE

## 2024-01-26 ENCOUNTER — PATIENT OUTREACH (OUTPATIENT)
Dept: ADMINISTRATIVE | Facility: HOSPITAL | Age: 64
End: 2024-01-26

## 2024-02-13 ENCOUNTER — PATIENT MESSAGE (OUTPATIENT)
Dept: ADMINISTRATIVE | Facility: HOSPITAL | Age: 64
End: 2024-02-13

## 2024-05-01 ENCOUNTER — OFFICE VISIT (OUTPATIENT)
Dept: PRIMARY CARE CLINIC | Facility: CLINIC | Age: 64
End: 2024-05-01
Payer: COMMERCIAL

## 2024-05-01 VITALS
OXYGEN SATURATION: 98 % | DIASTOLIC BLOOD PRESSURE: 78 MMHG | TEMPERATURE: 98 F | WEIGHT: 163 LBS | RESPIRATION RATE: 17 BRPM | HEART RATE: 82 BPM | HEIGHT: 66 IN | BODY MASS INDEX: 26.2 KG/M2 | SYSTOLIC BLOOD PRESSURE: 132 MMHG

## 2024-05-01 DIAGNOSIS — M19.90 ARTHRITIS: ICD-10-CM

## 2024-05-01 DIAGNOSIS — M54.16 LUMBAR RADICULOPATHY: Primary | ICD-10-CM

## 2024-05-01 DIAGNOSIS — M54.9 DORSALGIA, UNSPECIFIED: ICD-10-CM

## 2024-05-01 PROCEDURE — 3078F DIAST BP <80 MM HG: CPT | Mod: CPTII,,, | Performed by: FAMILY MEDICINE

## 2024-05-01 PROCEDURE — 99212 OFFICE O/P EST SF 10 MIN: CPT | Mod: ,,, | Performed by: FAMILY MEDICINE

## 2024-05-01 PROCEDURE — 3075F SYST BP GE 130 - 139MM HG: CPT | Mod: CPTII,,, | Performed by: FAMILY MEDICINE

## 2024-05-01 PROCEDURE — 1159F MED LIST DOCD IN RCRD: CPT | Mod: CPTII,,, | Performed by: FAMILY MEDICINE

## 2024-05-01 PROCEDURE — 1160F RVW MEDS BY RX/DR IN RCRD: CPT | Mod: CPTII,,, | Performed by: FAMILY MEDICINE

## 2024-05-01 PROCEDURE — 3008F BODY MASS INDEX DOCD: CPT | Mod: CPTII,,, | Performed by: FAMILY MEDICINE

## 2024-05-01 RX ORDER — CYANOCOBALAMIN 1000 UG/ML
1000 INJECTION, SOLUTION INTRAMUSCULAR; SUBCUTANEOUS
COMMUNITY
Start: 2024-01-29 | End: 2024-05-01 | Stop reason: ALTCHOICE

## 2024-05-01 NOTE — PROGRESS NOTES
Subjective     Patient ID: Emily Gonzalez is a 63 y.o. female.    Chief Complaint: Referral    Pt. Wants a second opinion. Pain clinic procedures not stopping back pain. Discussion.      Review of Systems   Constitutional:  Negative for fatigue and fever.   HENT:  Negative for nasal congestion and dental problem.    Eyes:  Negative for discharge.   Respiratory:  Negative for cough and shortness of breath.    Cardiovascular:  Negative for chest pain.   Gastrointestinal:  Negative for constipation, diarrhea, nausea and vomiting.   Genitourinary:  Negative for bladder incontinence, difficulty urinating and hot flashes.   Musculoskeletal:  Positive for back pain.   Allergic/Immunologic: Negative for environmental allergies.   Neurological:  Negative for headaches.   Psychiatric/Behavioral:  Negative for behavioral problems and confusion.           Objective     Physical Exam  Vitals and nursing note reviewed.   Constitutional:       Appearance: Normal appearance. She is normal weight.   HENT:      Head: Normocephalic and atraumatic.      Right Ear: Tympanic membrane normal.      Left Ear: Tympanic membrane normal.      Nose: Nose normal.      Mouth/Throat:      Mouth: Mucous membranes are moist.   Eyes:      Extraocular Movements: Extraocular movements intact.      Conjunctiva/sclera: Conjunctivae normal.      Pupils: Pupils are equal, round, and reactive to light.   Cardiovascular:      Rate and Rhythm: Normal rate and regular rhythm.      Pulses: Normal pulses.   Pulmonary:      Effort: Pulmonary effort is normal.      Breath sounds: Normal breath sounds.   Abdominal:      General: Abdomen is flat. Bowel sounds are normal.      Palpations: Abdomen is soft.   Musculoskeletal:         General: Normal range of motion.      Cervical back: Normal range of motion and neck supple.      Comments: Low back pain   Skin:     General: Skin is warm and dry.   Neurological:      General: No focal deficit present.      Mental  Status: She is alert and oriented to person, place, and time.      Comments: Left radiculopathy   Psychiatric:         Mood and Affect: Mood normal.            Assessment and Plan     1. Lumbar radiculopathy  -     Cancel: Ambulatory referral/consult to Neurosurgery; Future; Expected date: 05/08/2024  -     Ambulatory referral/consult to Neurosurgery; Future; Expected date: 05/08/2024    2. Dorsalgia, unspecified  -     Cancel: Ambulatory referral/consult to Neurosurgery; Future; Expected date: 05/08/2024  -     Ambulatory referral/consult to Neurosurgery; Future; Expected date: 05/08/2024    3. Arthritis        Pt. Wants to see Dr. NELI Porter at [facility]  RTC as needed         No follow-ups on file.

## 2024-05-08 DIAGNOSIS — M54.16 LUMBAR RADICULITIS: Primary | ICD-10-CM

## 2024-05-17 ENCOUNTER — CLINICAL SUPPORT (OUTPATIENT)
Dept: REHABILITATION | Facility: HOSPITAL | Age: 64
End: 2024-05-17
Payer: COMMERCIAL

## 2024-05-17 DIAGNOSIS — M54.16 LUMBAR RADICULITIS: ICD-10-CM

## 2024-05-17 PROBLEM — G89.29 CHRONIC BILATERAL LOW BACK PAIN WITH BILATERAL SCIATICA: Status: ACTIVE | Noted: 2023-04-03

## 2024-05-17 PROBLEM — M54.41 CHRONIC BILATERAL LOW BACK PAIN WITH BILATERAL SCIATICA: Status: ACTIVE | Noted: 2023-04-03

## 2024-05-17 PROCEDURE — 97014 ELECTRIC STIMULATION THERAPY: CPT

## 2024-05-17 PROCEDURE — 97140 MANUAL THERAPY 1/> REGIONS: CPT

## 2024-05-17 PROCEDURE — 97161 PT EVAL LOW COMPLEX 20 MIN: CPT

## 2024-05-17 PROCEDURE — 97110 THERAPEUTIC EXERCISES: CPT

## 2024-05-17 NOTE — PLAN OF CARE
[facility]   Physical Therapy Initial Evaluation      Name: Emily Gonzalez  [facility] Number: [id number]    Therapy Diagnosis:   Encounter Diagnosis   Name Primary?    Lumbar radiculitis         Physician: Gustabo Quinonez IV, *    Physician Orders: PT Eval and Treat   Medical Diagnosis from Referral: Lumbar radiculitis  Evaluation Date: 5/17/2024  Authorization Period Expiration: 5/8/2025  Plan of Care Expiration: 6/14/2024  Progress Note Due: 6/14/2024  Date of Surgery: NA   Visit # / Visits authorized: 1/8   FOTO: to be completed on visit 6    Precautions: Standard     Time In: 8:46  Time Out: 9:40  Total Billable Time: 54 minutes    Subjective     Date of onset: about 1.5 years ago     History of current condition - Philomena reports: onset of low back pain with initial L LE radiating pain about a year and a half ago. She denies any acute injuries that caused onset of pain. Patient reports that she has received multiple steroid injections, epidurals, and had nerve burns since the onset of pain. Her most recent epidural was about three weeks ago and she reports that she experienced no reduction in pain. Patient presents to PT today with bilateral low back pain and radiating symptoms into B lower extremities. Patient reports that she has requested to have her medical records transferred to a surgeon in [place] at this time.     Falls: None     Imaging: MRI studies: Degenerative change of the lumbar spine with spinal canal and neuroforaminal narrowing as detailed above. Moderate spinal canal and neural foraminal narrowing noted at several locations. 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   Prior Level of Function: Independent   Current Level of Function: Independent with activity limitations due to pain     Pain:  Current 4/10, worst 8/10, best 3/10   Location: bilateral low back and lower  "extremities   Description: Aching, Burning, Sharp, and Electric  Aggravating Factors: Sitting, Standing, Walking, Lifting, Getting out of bed/chair, and twisting   Easing Factors: pain medication, position changes, and rest     Patients goals: "decrease pain"     Medical History:   Past Medical History:   Diagnosis Date    Hypothyroidism        Surgical History:   Emily Gonzalez  has a past surgical history that includes Hysterectomy; Breast surgery; and Cholecystectomy.    Medications:   Emily has a current medication list which includes the following prescription(s): fluoxetine, hydrocodone-acetaminophen, ibuprofen, levothyroxine, pregabalin, and progesterone.    Allergies:   Review of patient's allergies indicates:  No Known Allergies   Lumbar Objective    Posture:  Standing lordosis: WNL  Sitting lordosis: WNL  Iliac crest height: equivalent bilateral   PSIS height: equivalent bilateral   Scoliosis: No    Lumbar active range of motion: WFLs in all directions     MANUAL MUSCLE TEST  Right left   Hip flexion MMT number: 4+/5 MMT strength: 4+/5   Hip abduction MMT strength: 4+/5 MMT strength: 4+/5   Knee extension MMT strength: 4+/5 MMT strength: 4+/5   Knee flexion  MMT strength: 4+/5 MMT strength: 4+/5   Ankle dorsiflexion MMT strength: 4+/5 MMT strength: 4+/5   Ankle plantar flexion  MMT strength: 4+/5 MMT strength: 4+/5   Extensor hallucis longus MMT strength: 4+/5 MMT strength: 4+/5     ROM/flexibility right left   Hamstring 90/90 (-10) -10 -10     Special test Right  Left    SLR test < 60 degrees Positive Positive   SLR test > 60 degrees Negative Negative   Sitting slump test Positive Positive   Piriformis test Negative Positive   DANI test Negative Negative   SI forward bend Negative Negative   SI distraction Negative Negative   SI compression Positive Positive     Spinal mobility:  Segment: Patient with increased lumbar mobility with poster/anterior mobilizations     Palpation:     Dermatome: Patient " "reports radicular symptoms as far as L3 on L side and L4-5 on R side     Intake Outcome Measure for FOTO Survey    Therapist reviewed FOTO scores for Emily Gonzalez on 5/17/2024.   FOTO report - see Media section or FOTO account episode details.    Intake Score: 42%         Treatment     Total Treatment time (time-based codes) separate from Evaluation: 39 minutes     Philomena received the treatments listed below:      therapeutic exercises to develop strength, flexibility, posture, and core stabilization for 9 minutes including:See flowsheet below     Ther-Ex Reps    Nustep    Wedge    Hamstring Stretch witch sciatic nerve glides  20 x each    Posterior Pelvic Tilts 20 x 3"    Lower Trunk Rotations    Single Knee to Chest Stretch 3 x 10" Each    Piriformis Stretch 2 x 30" Each    Figure 4 Stretch 2 x 30" each                      manual therapy techniques: Joint mobilizations, soft tissue Mobilization, and active neuromuscular release were applied to the: B quadratus lumborum, R piriformis, thoracic spine, sacrum, R SI joint, and B psoas muscles for 20 minutes to decrease tissue tightness, improved joint mobility, and decrease pain     supervised modalities after being cleared for contradictions: Biphasic ESTIM:  Emily received  electrical stimulation for pain and tissue tightness  to the B quadratus lumborum and piriformis. Pt received burst mode at a rate of 2 pps for 10 minutes. Emily tolerated treatment well without any adverse effects.     hot pack for 10 minutes to B lumbosacral muscles with ESTIM.    Patient Education and Home Exercises     Education provided:   - eval findings, plan of care, Home exercise program, sleeping positions    Written Home Exercises Provided: yes. Exercises were reviewed and Philomena was able to demonstrate them prior to the end of the session.  Philomena demonstrated good  understanding of the education provided. See EMR under Patient Instructions for exercises provided during therapy " "sessions.    Assessment     Emily is a 63 y.o. female referred to outpatient Physical Therapy with a medical diagnosis of lumbar radiculitis. Patient presents with low back pain, B lower extremity radiating symptoms, core muscle weakness, tissue tightness, and impaired motor control. Patient's impairments are currently limiting her overall activity tolerance and interfering with her sleep.     PT completed manual therapy interventions to improve patient's tissue extensibility and joint mobility in order to decrease stress on lumbar spine and nerve compression with completing ADLs and work related tasks. PT noted improved tissue extensibility and joint mobility following interventions and patient reported "I am actually feeling better," after treatment. No adverse effects noted to therapy tasks.     Patient prognosis is Good.   Patient will benefit from skilled outpatient Physical Therapy to address the deficits stated above and in the chart below, provide patient /family education, and to maximize patientt's level of independence.     Plan of care discussed with patient: Yes  Patient's spiritual, cultural and educational needs considered and patient is agreeable to the plan of care and goals as stated below:     Anticipated Barriers for therapy: chronicity of pain, biomechanical stress involved with work related tasks, presence of multilevel degenerative spine changes.     Medical Necessity is demonstrated by the following  History  Co-morbidities and personal factors that may impact the plan of care [x] LOW: no personal factors / co-morbidities  [] MODERATE: 1-2 personal factors / co-morbidities  [] HIGH: 3+ personal factors / co-morbidities    Moderate / High Support Documentation:   Co-morbidities affecting plan of care: NA    Personal Factors:   no deficits     Examination  Body Structures and Functions, activity limitations and participation restrictions that may impact the plan of care [x] LOW: addressing 1-2 " elements  [] MODERATE: 3+ elements  [] HIGH: 4+ elements (please support below)    Moderate / High Support Documentation: NA     Clinical Presentation [x] LOW: stable  [] MODERATE: Evolving  [] HIGH: Unstable     Decision Making/ Complexity Score: low       Goals:  Short Term Goals: 2 weeks   Patient will independently complete Home exercise program with correct form.    Patient will report decreased pain at worst to less than or equal to 6/10 for improved quality of life.     Long Term Goals: 4 weeks   Patient will report decreased pain to less than or equal to 5/10 at worst for improved quality of life.   Patient will report decrease in radiating occurrences from 100% of the time to less than or equal to 50% to allow patient the ability to perform activities of daily living   Patient will increase core muscle strength to 4+/5 to improve ability to complete job related tasks with less pain.  Patient will increase B hamstring 90/90 by 10 degrees    Plan     Plan of care Certification: 5/17/2024 to 6/14/2024.    Outpatient Physical Therapy 2 times weekly for 4 weeks to include the following interventions: Electrical Stimulation unattended, Manual Therapy, Moist Heat/ Ice, Patient Education, Therapeutic Activities, Therapeutic Exercise, and Ultrasound.     Marlon Goetz, PT, DPT         Physician's Signature: _________________________________________ Date: __________

## 2024-05-21 ENCOUNTER — TELEPHONE (OUTPATIENT)
Dept: PRIMARY CARE CLINIC | Facility: CLINIC | Age: 64
End: 2024-05-21
Payer: COMMERCIAL

## 2024-05-21 DIAGNOSIS — M54.16 LUMBAR RADICULOPATHY, CHRONIC: Primary | ICD-10-CM

## 2024-05-22 ENCOUNTER — CLINICAL SUPPORT (OUTPATIENT)
Dept: REHABILITATION | Facility: HOSPITAL | Age: 64
End: 2024-05-22
Payer: COMMERCIAL

## 2024-05-22 DIAGNOSIS — M54.16 LUMBAR RADICULITIS: Primary | ICD-10-CM

## 2024-05-22 PROCEDURE — 97010 HOT OR COLD PACKS THERAPY: CPT | Mod: CQ

## 2024-05-22 PROCEDURE — 97014 ELECTRIC STIMULATION THERAPY: CPT | Mod: CQ

## 2024-05-22 PROCEDURE — 97110 THERAPEUTIC EXERCISES: CPT | Mod: CQ

## 2024-05-22 NOTE — PROGRESS NOTES
"[facility]   Physical Therapy Treatment Note      Name: Emily Gonzalez  Clinic Number: [id number]    Therapy Diagnosis: No diagnosis found.  Physician: Gustabo Quinonez IV, *    Visit Date: 5/22/2024    Physician Orders: PT Eval and Treat   Medical Diagnosis from Referral: Lumbar radiculitis  Evaluation Date: 5/17/2024  Authorization Period Expiration: 5/8/2025  Plan of Care Expiration: 6/14/2024  Progress Note Due: 6/14/2024  Date of Surgery: NA   Visit # / Visits authorized: 2/8   FOTO: to be completed on visit 6     Precautions: Standard      Time In: 13:17  Time Out: 14:02  Total Billable Time: 45 minutes    PTA Visit #: 1/5       Subjective     Patient reports: "I'm still hurting a good bit."    She was compliant with home exercise program.  Response to previous treatment: N/A  Functional change: Too early in Plan of Care     Pain: 6/10  Location: bilateral back      Objective      Objective Measures updated at progress report unless specified.     Treatment     Philomena received the treatments listed below:      therapeutic exercises to develop strength, flexibility, posture, and core stabilization for 35 minutes including:See flowsheet below      Ther-Ex Reps    Nustep 8 minutes    Wedge 2 minutes    Hamstring Stretch witch sciatic nerve glides  20 x each    Posterior Pelvic Tilts 20 x 3"    Lower Trunk Rotations 5 x 10"   Single Knee to Chest Stretch 3 x 10" Each    Piriformis Stretch 2 x 30" Each    Figure 4 Stretch 2 x 30" each                               NOT COMPLETED---manual therapy techniques: Joint mobilizations, soft tissue Mobilization, and active neuromuscular release were applied to the: B quadratus lumborum, R piriformis, thoracic spine, sacrum, R SI joint, and B psoas muscles for 20 minutes to decrease tissue tightness, improved joint mobility, and decrease pain      supervised modalities after being cleared for contradictions: Biphasic ESTIM:  Emily received  " electrical stimulation for pain and tissue tightness  to the B quadratus lumborum and piriformis. Pt received burst mode at a rate of 2 pps for 10 minutes. Emily tolerated treatment well without any adverse effects.      hot pack for 10 minutes to B lumbosacral muscles with ESTIM.       Patient Education and Home Exercises       Education provided:   - Plan of Care, Home exercise program, Delayed onset muscle soreness     Written Home Exercises Provided: Patient instructed to cont prior HEP. Exercises were reviewed and Philomena was able to demonstrate them prior to the end of the session.  Philomena demonstrated good  understanding of the education provided. See Electronic Medical Record under Patient Instructions for exercises provided during therapy sessions    Assessment   Emily is a 63 y.o. female referred to outpatient Physical Therapy with a medical diagnosis of lumbar radiculitis. Patient presents with low back pain, B lower extremity radiating symptoms, core muscle weakness, tissue tightness, and impaired motor control. Patient's impairments are currently limiting her overall activity tolerance and interfering with her sleep. LPTA provided pt with proper setup on NuStep to decrease stiffness and improve musculoskeletal warmup prior to exercises. Pt prompted with moderate verbal and visual cues for proper form, count, hold times, and decreased compensations. Manual therapy with-held secondary to increased soreness. No adverse effects noted.     Patient prognosis is Good.   Patient will benefit from skilled outpatient Physical Therapy to address the deficits stated above and in the chart below, provide patient /family education, and to maximize patientt's level of independence.      Plan of care discussed with patient: Yes  Patient's spiritual, cultural and educational needs considered and patient is agreeable to the plan of care and goals as stated below:      Anticipated Barriers for therapy: chronicity of pain,  biomechanical stress involved with work related tasks, presence of multilevel degenerative spine changes.     Goals:  Short Term Goals: 2 weeks   Patient will independently complete Home exercise program with correct form.    Patient will report decreased pain at worst to less than or equal to 6/10 for improved quality of life.      Long Term Goals: 4 weeks   Patient will report decreased pain to less than or equal to 5/10 at worst for improved quality of life.   Patient will report decrease in radiating occurrences from 100% of the time to less than or equal to 50% to allow patient the ability to perform activities of daily living   Patient will increase core muscle strength to 4+/5 to improve ability to complete job related tasks with less pain.  Patient will increase B hamstring 90/90 by 10 degrees    Plan   Plan of care Certification: 5/17/2024 to 6/14/2024.     Outpatient Physical Therapy 2 times weekly for 4 weeks to include the following interventions: Electrical Stimulation unattended, Manual Therapy, Moist Heat/ Ice, Patient Education, Therapeutic Activities, Therapeutic Exercise, and Ultrasound.     Continue POC per PT orders to progress patient toward rehab goals.    STEPHIE Mckinney  5/22/2024

## 2024-05-24 ENCOUNTER — CLINICAL SUPPORT (OUTPATIENT)
Dept: REHABILITATION | Facility: HOSPITAL | Age: 64
End: 2024-05-24
Payer: COMMERCIAL

## 2024-05-24 DIAGNOSIS — M54.16 LUMBAR RADICULITIS: Primary | ICD-10-CM

## 2024-05-24 PROCEDURE — 97140 MANUAL THERAPY 1/> REGIONS: CPT

## 2024-05-24 PROCEDURE — 97110 THERAPEUTIC EXERCISES: CPT

## 2024-05-24 PROCEDURE — 97014 ELECTRIC STIMULATION THERAPY: CPT

## 2024-05-24 NOTE — PROGRESS NOTES
"[facility]   Physical Therapy Treatment Note      Name: Emily Gonzalez  Clinic Number: [id number]    Therapy Diagnosis:   Encounter Diagnosis   Name Primary?    Lumbar radiculitis Yes     Physician: Gustabo Quinonez IV, *    Visit Date: 5/24/2024    Physician Orders: PT Eval and Treat   Medical Diagnosis from Referral: Lumbar radiculitis  Evaluation Date: 5/17/2024  Authorization Period Expiration: 5/8/2025  Plan of Care Expiration: 6/14/2024  Progress Note Due: 6/14/2024  Date of Surgery: NA   Visit # / Visits authorized: 3/8   FOTO: to be completed on visit 6     Precautions: Standard      Time In: 9:26  Time Out: 10:20  Total Billable Time: 54 minutes    PTA Visit #: 0/5     Subjective     Patient reports: "It's about the same."     She was compliant with home exercise program.  Response to previous treatment: N/A  Functional change: Too early in Plan of Care     Pain: 3/10  Location: bilateral back      Objective      Objective Measures updated at progress report unless specified.     Treatment     Philomena received the treatments listed below:      therapeutic exercises to develop strength, flexibility, posture, and core stabilization for 34 minutes including:See flowsheet below      Ther-Ex Reps    Nustep 8 minutes    Wedge 2 minutes    Hamstring Stretch witch sciatic nerve glides  20 x each    Posterior Pelvic Tilts 20 x 3"    Lower Trunk Rotations 5 x 10" with ball    Single Knee to Chest Stretch 3 x 10" Each    Piriformis Stretch 3 x 30" Each    Figure 4 Stretch 3 x 30" each                            manual therapy techniques: Joint mobilizations, soft tissue Mobilization, and active neuromuscular release were applied to the: R quadratus lumborum, L piriformis, sacrum, and L SI joint for 10 minutes to decrease tissue tightness, improved joint mobility, and decrease pain      supervised modalities after being cleared for contradictions: Biphasic ESTIM:  Emily received  electrical " stimulation for pain and tissue tightness  to the B quadratus lumborum and piriformis. Pt received burst mode at a rate of 2 pps for 10 minutes. Emily tolerated treatment well without any adverse effects.      hot pack for 10 minutes to B lumbosacral muscles with ESTIM.       Patient Education and Home Exercises       Education provided:   - Plan of Care, Home exercise program, Delayed onset muscle soreness     Written Home Exercises Provided: Patient instructed to cont prior HEP. Exercises were reviewed and Philomena was able to demonstrate them prior to the end of the session.  Philomena demonstrated good  understanding of the education provided. See Electronic Medical Record under Patient Instructions for exercises provided during therapy sessions    Assessment   Emily is a 63 y.o. female referred to outpatient Physical Therapy with a medical diagnosis of lumbar radiculitis. Patient presents with low back pain, B lower extremity radiating symptoms, core muscle weakness, tissue tightness, and impaired motor control. Patient's impairments are currently limiting her overall activity tolerance and interfering with her sleep. PT provided pt with proper setup on rec bike to decrease stiffness and improve musculoskeletal warmup prior to exercises. Patient demonstrates good carryover of exercise instruction from previous sessions. PT noted improved tissue extensibility and joint mobility following manual interventions. No adverse effects noted to treatment tasks.      Patient prognosis is Good.   Patient will benefit from skilled outpatient Physical Therapy to address the deficits stated above and in the chart below, provide patient /family education, and to maximize patientt's level of independence.      Plan of care discussed with patient: Yes  Patient's spiritual, cultural and educational needs considered and patient is agreeable to the plan of care and goals as stated below:      Anticipated Barriers for therapy:  chronicity of pain, biomechanical stress involved with work related tasks, presence of multilevel degenerative spine changes.     Goals:  Short Term Goals: 2 weeks   Patient will independently complete Home exercise program with correct form.    Patient will report decreased pain at worst to less than or equal to 6/10 for improved quality of life.      Long Term Goals: 4 weeks   Patient will report decreased pain to less than or equal to 5/10 at worst for improved quality of life.   Patient will report decrease in radiating occurrences from 100% of the time to less than or equal to 50% to allow patient the ability to perform activities of daily living   Patient will increase core muscle strength to 4+/5 to improve ability to complete job related tasks with less pain.  Patient will increase B hamstring 90/90 by 10 degrees    Plan   Plan of care Certification: 5/17/2024 to 6/14/2024.     Outpatient Physical Therapy 2 times weekly for 4 weeks to include the following interventions: Electrical Stimulation unattended, Manual Therapy, Moist Heat/ Ice, Patient Education, Therapeutic Activities, Therapeutic Exercise, and Ultrasound.     Continue POC per PT orders to progress patient toward rehab goals.    Marlon Goetz, PT, DPT     5/24/2024

## 2024-05-30 ENCOUNTER — CLINICAL SUPPORT (OUTPATIENT)
Dept: REHABILITATION | Facility: HOSPITAL | Age: 64
End: 2024-05-30
Payer: COMMERCIAL

## 2024-05-30 DIAGNOSIS — M54.16 LUMBAR RADICULITIS: Primary | ICD-10-CM

## 2024-05-30 PROCEDURE — 97110 THERAPEUTIC EXERCISES: CPT | Mod: CQ

## 2024-05-30 PROCEDURE — 97140 MANUAL THERAPY 1/> REGIONS: CPT | Mod: CQ

## 2024-05-30 PROCEDURE — 97014 ELECTRIC STIMULATION THERAPY: CPT | Mod: CQ

## 2024-05-30 NOTE — PROGRESS NOTES
"[facility]   Physical Therapy Treatment Note      Name: Emily Gonzalez  Clinic Number: [id number]    Therapy Diagnosis:   No diagnosis found.    Physician: Gustabo Quinonez IV, *    Visit Date: 5/30/2024    Physician Orders: PT Eval and Treat   Medical Diagnosis from Referral: Lumbar radiculitis  Evaluation Date: 5/17/2024  Authorization Period Expiration: 5/8/2025  Plan of Care Expiration: 6/14/2024  Progress Note Due: 6/14/2024  Date of Surgery: NA   Visit # / Visits authorized: 4/8   FOTO: to be completed on visit 6     Precautions: Standard      Time In: 10:15  Time Out: 11:10  Total Billable Time: 55 minutes     PTA Visit #: 1/5    Subjective     Patient reports: "It's not better today.  I took a pain pill before I come".      She was compliant with home exercise program.  Response to previous treatment: N/A  Functional change: Too early in Plan of Care     Pain: 6/10  Location: bilateral low back, midline;  Left hip lateral     Objective      Objective Measures updated at progress report unless specified.     Treatment     Philomena received the treatments listed below:      therapeutic exercises to develop strength, flexibility, posture, and core stabilization. See flowsheet below.      Ther-Ex Reps    Nustep 8 minutes (bike)    Wedge 2 minutes    Hamstring Stretch witch sciatic nerve glides  20 x each    Posterior Pelvic Tilts 20 x 3"    Lower Trunk Rotations 5 x 10" with ball    Single Knee to Chest Stretch 3 x 10" Each    Piriformis Stretch 3 x 30" Each    Figure 4 Stretch 3 x 30" each                              Prior to manual therapy:   supervised modalities after being cleared for contradictions: Biphasic ESTIM:  Emily received  electrical stimulation for pain and tissue tightness  to the B quadratus lumborum and piriformis. Pt received burst mode at a rate of 2 pps for 10 minutes. Emily tolerated treatment well without any adverse effects.      hot pack for 10 minutes to B " "lumbosacral muscles with ESTIM.       manual therapy techniques: Joint mobilizations, soft tissue Mobilization, and active neuromuscular release were applied to the: Left Quadratus Lumborum, Left Glut Min, Left Piriformis, and Left TFL to decrease trigger points and soft tissue tightness prior to muscle energy technique to correct LEFT pelvic up-slip.          Patient Education and Home Exercises       Education provided:   - Plan of Care, Home exercise program, Delayed onset muscle soreness     Written Home Exercises Provided: Patient instructed to cont prior HEP. Exercises were reviewed and Philomena was able to demonstrate them prior to the end of the session.  Philomena demonstrated good  understanding of the education provided. See Electronic Medical Record under Patient Instructions for exercises provided during therapy sessions    Assessment   Emily is a 63 y.o. female referred to outpatient Physical Therapy with a medical diagnosis of lumbar radiculitis. Patient presents with low back pain, B lower extremity radiating symptoms, core muscle weakness, tissue tightness, and impaired motor control. Patient's impairments are currently limiting her overall activity tolerance and interfering with her sleep. LPTA  provided pt with proper setup on rec bike to decrease stiffness and improve musculoskeletal warmup prior to exercises. Patient continues to demonstrate  good carryover of exercise instruction from previous sessions. Patient reports increased discomfort when initializing manual therapy techniques, but at end of physical therapy treatment session, patient reports "I feel good!" And rates pain in low back and Left hip decreased to 2/10.          Patient prognosis is Good.   Patient will benefit from skilled outpatient Physical Therapy to address the deficits stated above and in the chart below, provide patient /family education, and to maximize patientt's level of independence.      Plan of care discussed with " patient: Yes  Patient's spiritual, cultural and educational needs considered and patient is agreeable to the plan of care and goals as stated below:      Anticipated Barriers for therapy: chronicity of pain, biomechanical stress involved with work related tasks, presence of multilevel degenerative spine changes.     Goals:  Short Term Goals: 2 weeks   Patient will independently complete Home exercise program with correct form.    Patient will report decreased pain at worst to less than or equal to 6/10 for improved quality of life.      Long Term Goals: 4 weeks   Patient will report decreased pain to less than or equal to 5/10 at worst for improved quality of life.   Patient will report decrease in radiating occurrences from 100% of the time to less than or equal to 50% to allow patient the ability to perform activities of daily living   Patient will increase core muscle strength to 4+/5 to improve ability to complete job related tasks with less pain.  Patient will increase B hamstring 90/90 by 10 degrees    Plan   Plan of care Certification: 5/17/2024 to 6/14/2024.     Outpatient Physical Therapy 2 times weekly for 4 weeks to include the following interventions: Electrical Stimulation unattended, Manual Therapy, Moist Heat/ Ice, Patient Education, Therapeutic Activities, Therapeutic Exercise, and Ultrasound.     Continue POC per PT orders to progress patient toward rehab goals.    STEPHIE Shaikh     5/30/2024

## 2024-05-31 ENCOUNTER — CLINICAL SUPPORT (OUTPATIENT)
Dept: REHABILITATION | Facility: HOSPITAL | Age: 64
End: 2024-05-31
Payer: COMMERCIAL

## 2024-05-31 DIAGNOSIS — M54.16 LUMBAR RADICULITIS: Primary | ICD-10-CM

## 2024-05-31 PROCEDURE — 97014 ELECTRIC STIMULATION THERAPY: CPT | Mod: CQ

## 2024-05-31 PROCEDURE — 97110 THERAPEUTIC EXERCISES: CPT | Mod: CQ

## 2024-05-31 PROCEDURE — 97010 HOT OR COLD PACKS THERAPY: CPT | Mod: CQ

## 2024-05-31 NOTE — PROGRESS NOTES
"[facility]   Physical Therapy Treatment Note      Name: Emily Gonzalez  Clinic Number: [id number]    Therapy Diagnosis:   No diagnosis found.    Physician: Gustabo Quinonez IV, *    Visit Date: 5/31/2024    Physician Orders: PT Eval and Treat   Medical Diagnosis from Referral: Lumbar radiculitis  Evaluation Date: 5/17/2024  Authorization Period Expiration: 5/8/2025  Plan of Care Expiration: 6/14/2024  Progress Note Due: 6/14/2024  Date of Surgery: NA   Visit # / Visits authorized: 5/8   FOTO: to be completed on visit 6     Precautions: Standard      Time In: 9:30  Time Out: 10:15  Total Billable Time: 55 minutes     PTA Visit #: 2/5    Subjective     Patient reports: "It's still hurting but I took a pain pill before I came".      She was compliant with home exercise program.  Response to previous treatment: N/A  Functional change: Too early in Plan of Care     Pain: 6/10  Location: bilateral low back, midline;  Left hip lateral     Objective      Objective Measures updated at progress report unless specified.     Treatment     Philomena received the treatments listed below:      therapeutic exercises to develop strength, flexibility, posture, and core stabilization. See flowsheet below.      Ther-Ex Reps    Nustep 8 minutes (bike)    Wedge 2 minutes    Hamstring Stretch witch sciatic nerve glides  20 x each    Posterior Pelvic Tilts 20 x 3"    Lower Trunk Rotations 5 x 10" with ball    Single Knee to Chest Stretch 3 x 10" Each    Piriformis Stretch 3 x 30" Each    Figure 4 Stretch 3 x 30" each                              Prior to manual therapy:   supervised modalities after being cleared for contradictions: Biphasic ESTIM:  Emily received  electrical stimulation for pain and tissue tightness  to the B quadratus lumborum and piriformis. Pt received burst mode at a rate of 2 pps for 10 minutes. Emily tolerated treatment well without any adverse effects.      hot pack for 10 minutes to B " NIBP STAT measurement started. lumbosacral muscles with ESTIM.       NOT COMPLETED---manual therapy techniques: Joint mobilizations, soft tissue Mobilization, and active neuromuscular release were applied to the: Left Quadratus Lumborum, Left Glut Min, Left Piriformis, and Left TFL to decrease trigger points and soft tissue tightness prior to muscle energy technique to correct LEFT pelvic up-slip.          Patient Education and Home Exercises       Education provided:   - Plan of Care, Home exercise program, Delayed onset muscle soreness     Written Home Exercises Provided: Patient instructed to cont prior HEP. Exercises were reviewed and Philomena was able to demonstrate them prior to the end of the session.  Philomena demonstrated good  understanding of the education provided. See Electronic Medical Record under Patient Instructions for exercises provided during therapy sessions    Assessment   Emily is a 63 y.o. female referred to outpatient Physical Therapy with a medical diagnosis of lumbar radiculitis. Patient presents with low back pain, B lower extremity radiating symptoms, core muscle weakness, tissue tightness, and impaired motor control. Patient's impairments are currently limiting her overall activity tolerance and interfering with her sleep. LPTA provided pt with proper setup on NuStep to decrease stiffness and improve musculoskeletal warmup prior to exercises. Pt continues to display good carryover of exercise instruction from previous sessions. Manual therapy with-held secondary to increased soreness from previous manual therapy. Pt reports decreased pain following modalities.     Patient prognosis is Good.   Patient will benefit from skilled outpatient Physical Therapy to address the deficits stated above and in the chart below, provide patient /family education, and to maximize patientt's level of independence.      Plan of care discussed with patient: Yes  Patient's spiritual, cultural and educational needs considered and patient is  agreeable to the plan of care and goals as stated below:      Anticipated Barriers for therapy: chronicity of pain, biomechanical stress involved with work related tasks, presence of multilevel degenerative spine changes.     Goals:  Short Term Goals: 2 weeks   Patient will independently complete Home exercise program with correct form.    Patient will report decreased pain at worst to less than or equal to 6/10 for improved quality of life.      Long Term Goals: 4 weeks   Patient will report decreased pain to less than or equal to 5/10 at worst for improved quality of life.   Patient will report decrease in radiating occurrences from 100% of the time to less than or equal to 50% to allow patient the ability to perform activities of daily living   Patient will increase core muscle strength to 4+/5 to improve ability to complete job related tasks with less pain.  Patient will increase B hamstring 90/90 by 10 degrees    Plan   Plan of care Certification: 5/17/2024 to 6/14/2024.     Outpatient Physical Therapy 2 times weekly for 4 weeks to include the following interventions: Electrical Stimulation unattended, Manual Therapy, Moist Heat/ Ice, Patient Education, Therapeutic Activities, Therapeutic Exercise, and Ultrasound.     Continue POC per PT orders to progress patient toward rehab goals.    STEPHIE Mckinney   5/31/2024

## 2024-06-04 ENCOUNTER — CLINICAL SUPPORT (OUTPATIENT)
Dept: REHABILITATION | Facility: HOSPITAL | Age: 64
End: 2024-06-04
Payer: COMMERCIAL

## 2024-06-04 DIAGNOSIS — M54.16 LUMBAR RADICULITIS: Primary | ICD-10-CM

## 2024-06-04 PROCEDURE — 97014 ELECTRIC STIMULATION THERAPY: CPT

## 2024-06-04 PROCEDURE — 97110 THERAPEUTIC EXERCISES: CPT

## 2024-06-04 PROCEDURE — 97140 MANUAL THERAPY 1/> REGIONS: CPT

## 2024-06-04 NOTE — PROGRESS NOTES
"[facility]   Physical Therapy Treatment Note      Name: Emily Gonzalez  Clinic Number: [id number]    Therapy Diagnosis:   Encounter Diagnosis   Name Primary?    Lumbar radiculitis Yes       Physician: Gustabo Quinonez IV, *    Visit Date: 6/4/2024    Physician Orders: PT Eval and Treat   Medical Diagnosis from Referral: Lumbar radiculitis  Evaluation Date: 5/17/2024  Authorization Period Expiration: 5/8/2025  Plan of Care Expiration: 6/14/2024  Progress Note Due: 6/14/2024  Date of Surgery: NA   Visit # / Visits authorized: 6/8   FOTO: to be completed on visit 6     Precautions: Standard      Time In: 10:18  Time Out: 11:15  Total Billable Time: 57 minutes     PTA Visit #: 0/5    Subjective     Patient reports: "It's about the same but now it is going over to the R side."     She was compliant with home exercise program.  Response to previous treatment: N/A  Functional change: no significant improvements in pain    Pain: 3/10  Location: bilateral low back and R hip     Objective      Objective Measures updated at progress report unless specified.     Treatment     Philomena received the treatments listed below:      therapeutic exercises to develop strength, flexibility, posture, and core stabilization for 35 minutes . See flowsheet below.      Ther-Ex Reps    Nustep 8 minutes (bike)    Wedge 2 minutes    Hamstring Stretch witch sciatic nerve glides  20 x each    Posterior Pelvic Tilts 30 x 3"    Lower Trunk Rotations 5 x 10" with ball    Single Knee to Chest Stretch 5 x 10" Each    Piriformis Stretch 3 x 30" Each    Figure 4 Stretch 3 x 30" each     Seated Hamstring stretch  2 x 30" each                        supervised modalities after being cleared for contradictions: Biphasic ESTIM:  Emily received  electrical stimulation for pain and tissue tightness  to the B quadratus lumborum and piriformis. Pt received burst mode at a rate of 2 pps for 10 minutes. Emily tolerated treatment well " without any adverse effects.      hot pack for 10 minutes to B lumbosacral muscles with ESTIM.     manual therapy techniques for 12 minutes: Joint mobilizations, soft tissue Mobilization, and active neuromuscular release were applied to the: Right Quadratus Lumborum, Right Glut Min, Right Piriformis, sacrum, and thoracic spine      Patient Education and Home Exercises       Education provided:   - Plan of Care, Home exercise program, Delayed onset muscle soreness     Written Home Exercises Provided: Patient instructed to cont prior HEP. Exercises were reviewed and Philomena was able to demonstrate them prior to the end of the session.  Philomena demonstrated good  understanding of the education provided. See Electronic Medical Record under Patient Instructions for exercises provided during therapy sessions    Assessment   Emily is a 63 y.o. female referred to outpatient Physical Therapy with a medical diagnosis of lumbar radiculitis. Patient presents with low back pain, B lower extremity radiating symptoms, core muscle weakness, tissue tightness, and impaired motor control. Patient's impairments are currently limiting her overall activity tolerance and interfering with her sleep. PT provided pt with proper setup on NuStep to decrease stiffness and improve musculoskeletal warmup prior to exercises. Pt continues to display good carryover of exercise instruction from previous sessions. Patient reported increased pain in R hip after exercises. PT noted multiple areas of tissue tightness and decrease joint mobility upon palpation assessment.Tissue extensibility and joint mobility improved with manual interventions.    Patient prognosis is Good.   Patient will benefit from skilled outpatient Physical Therapy to address the deficits stated above and in the chart below, provide patient /family education, and to maximize patientt's level of independence.      Plan of care discussed with patient: Yes  Patient's spiritual, cultural  and educational needs considered and patient is agreeable to the plan of care and goals as stated below:      Anticipated Barriers for therapy: chronicity of pain, biomechanical stress involved with work related tasks, presence of multilevel degenerative spine changes.     Goals:  Short Term Goals: 2 weeks   Patient will independently complete Home exercise program with correct form.    Patient will report decreased pain at worst to less than or equal to 6/10 for improved quality of life.      Long Term Goals: 4 weeks   Patient will report decreased pain to less than or equal to 5/10 at worst for improved quality of life.   Patient will report decrease in radiating occurrences from 100% of the time to less than or equal to 50% to allow patient the ability to perform activities of daily living   Patient will increase core muscle strength to 4+/5 to improve ability to complete job related tasks with less pain.  Patient will increase B hamstring 90/90 by 10 degrees    Plan   Plan of care Certification: 5/17/2024 to 6/14/2024.     Outpatient Physical Therapy 2 times weekly for 4 weeks to include the following interventions: Electrical Stimulation unattended, Manual Therapy, Moist Heat/ Ice, Patient Education, Therapeutic Activities, Therapeutic Exercise, and Ultrasound.     Continue POC per PT orders to progress patient toward rehab goals.    Marlon Goetz, PT, DPT     6/4/2024

## 2024-06-05 ENCOUNTER — CLINICAL SUPPORT (OUTPATIENT)
Dept: REHABILITATION | Facility: HOSPITAL | Age: 64
End: 2024-06-05
Payer: COMMERCIAL

## 2024-06-05 DIAGNOSIS — M54.16 LUMBAR RADICULITIS: Primary | ICD-10-CM

## 2024-06-05 PROCEDURE — 97014 ELECTRIC STIMULATION THERAPY: CPT | Mod: CQ

## 2024-06-05 PROCEDURE — 97110 THERAPEUTIC EXERCISES: CPT | Mod: CQ

## 2024-06-05 PROCEDURE — 97140 MANUAL THERAPY 1/> REGIONS: CPT | Mod: CQ

## 2024-06-05 NOTE — PROGRESS NOTES
"[facility]   Physical Therapy Treatment Note      Name: Emily Gonzalez  Clinic Number: [id number]    Therapy Diagnosis:   No diagnosis found.      Physician: Gustabo Quinonez IV, *    Visit Date: 6/5/2024    Physician Orders: PT Eval and Treat   Medical Diagnosis from Referral: Lumbar radiculitis  Evaluation Date: 5/17/2024  Authorization Period Expiration: 5/8/2025  Plan of Care Expiration: 6/14/2024  Progress Note Due: 6/14/2024  Date of Surgery: NA   Visit # / Visits authorized: 7/8   FOTO: to be completed on visit 6     Precautions: Standard      Time In: 10:12  Time Out:  11:08  Total Billable Time :  56 minutes      PTA Visit #: 1/5    Subjective     Patient reports: "I'm hurting on my Right side".    She was compliant with home exercise program.  Response to previous treatment: N/A  Functional change: no significant improvements in pain    Pain: 3/10  Location: Right hip, Right lateral thigh     Objective      Objective Measures updated at progress report unless specified.     Treatment     Philomena received the treatments listed below:      therapeutic exercises to develop strength, flexibility, posture, and core stabilization for 34 minutes . See flowsheet below.      Ther-Ex Reps    Nustep 8 minutes (bike)    Wedge 2 minutes    Hamstring Stretch witch sciatic nerve glides  20 x each    Posterior Pelvic Tilts 30 x 3"    Lower Trunk Rotations 5 x 10" with ball    Single Knee to Chest Stretch 5 x 10" Each    Piriformis Stretch 3 x 30" Each    Figure 4 Stretch 3 x 30" each     Seated Hamstring stretch  2 x 30" each                        supervised modalities after being cleared for contradictions: Biphasic ESTIM:  Emily received  electrical stimulation for pain and tissue tightness  to the B quadratus lumborum and piriformis. Pt received burst mode at a rate of 2 pps for 10 minutes. Emily tolerated treatment well without any adverse effects.      hot pack for 10 minutes to B " lumbosacral muscles with ESTIM.     manual therapy techniques for 12 minutes: Joint mobilizations, soft tissue Mobilization, and active neuromuscular release were applied to the: Right TFL, Glut Min, and IT Band to decrease MYF adhesions and soft tissue tightness.      Patient Education and Home Exercises       Education provided:   - Plan of Care, Home exercise program, Delayed onset muscle soreness     Written Home Exercises Provided: Patient instructed to cont prior HEP. Exercises were reviewed and Philomena was able to demonstrate them prior to the end of the session.  Philomena demonstrated good  understanding of the education provided. See Electronic Medical Record under Patient Instructions for exercises provided during therapy sessions    Assessment   Emily is a 63 y.o. female referred to outpatient Physical Therapy with a medical diagnosis of lumbar radiculitis. Patient presents with low back pain, B lower extremity radiating symptoms, core muscle weakness, tissue tightness, and impaired motor control. Patient's impairments are currently limiting her overall activity tolerance and interfering with her sleep. LPTA  provided pt with proper setup on NuStep to decrease stiffness and improve musculoskeletal warmup prior to exercises. Pt continues to display good carryover of exercise instruction from previous sessions. LPTA noted multiple myofascial adhesions in Right ITB and soft tissue tightness in Right TFL.  Patient initially reports feeling moderate discomfort during manual therapy technique applications, but at end of physical therapy treatment session, patient reports Right hip and Right lateral thigh pain decreased to 0/10     Patient prognosis is Good.   Patient will benefit from skilled outpatient Physical Therapy to address the deficits stated above and in the chart below, provide patient /family education, and to maximize patientt's level of independence.      Plan of care discussed with patient:  Yes  Patient's spiritual, cultural and educational needs considered and patient is agreeable to the plan of care and goals as stated below:      Anticipated Barriers for therapy: chronicity of pain, biomechanical stress involved with work related tasks, presence of multilevel degenerative spine changes.     Goals:  Short Term Goals: 2 weeks   Patient will independently complete Home exercise program with correct form.    Patient will report decreased pain at worst to less than or equal to 6/10 for improved quality of life.      Long Term Goals: 4 weeks   Patient will report decreased pain to less than or equal to 5/10 at worst for improved quality of life.   Patient will report decrease in radiating occurrences from 100% of the time to less than or equal to 50% to allow patient the ability to perform activities of daily living   Patient will increase core muscle strength to 4+/5 to improve ability to complete job related tasks with less pain.  Patient will increase B hamstring 90/90 by 10 degrees    Plan   Plan of care Certification: 5/17/2024 to 6/14/2024.     Outpatient Physical Therapy 2 times weekly for 4 weeks to include the following interventions: Electrical Stimulation unattended, Manual Therapy, Moist Heat/ Ice, Patient Education, Therapeutic Activities, Therapeutic Exercise, and Ultrasound.     Continue POC per PT orders to progress patient toward rehab goals.    STEPHIE Shaikh     6/5/2024

## 2024-06-11 ENCOUNTER — HOSPITAL ENCOUNTER (OUTPATIENT)
Dept: RADIOLOGY | Facility: HOSPITAL | Age: 64
Discharge: HOME | End: 2024-06-11
Attending: FAMILY MEDICINE
Payer: COMMERCIAL

## 2024-06-11 DIAGNOSIS — M54.16 LUMBAR RADICULOPATHY, CHRONIC: ICD-10-CM

## 2024-06-11 PROCEDURE — 72148 MRI LUMBAR SPINE W/O DYE: CPT | Mod: TC

## 2024-06-12 ENCOUNTER — DOCUMENTATION ONLY (OUTPATIENT)
Dept: REHABILITATION | Facility: HOSPITAL | Age: 64
End: 2024-06-12
Payer: COMMERCIAL

## 2024-06-12 ENCOUNTER — CLINICAL SUPPORT (OUTPATIENT)
Dept: REHABILITATION | Facility: HOSPITAL | Age: 64
End: 2024-06-12
Payer: COMMERCIAL

## 2024-06-12 DIAGNOSIS — M54.16 LUMBAR RADICULITIS: Primary | ICD-10-CM

## 2024-06-12 PROCEDURE — 97140 MANUAL THERAPY 1/> REGIONS: CPT | Mod: CQ

## 2024-06-12 PROCEDURE — 97110 THERAPEUTIC EXERCISES: CPT | Mod: CQ

## 2024-06-12 NOTE — PROGRESS NOTES
"[facility]   Physical Therapy Treatment Note      Name: Emily Gonzalez  Clinic Number: [id number]    Therapy Diagnosis:   No diagnosis found.      Physician: Gustabo Quinonez IV, *    Visit Date: 6/12/2024    Physician Orders: PT Eval and Treat   Medical Diagnosis from Referral: Lumbar radiculitis  Evaluation Date: 5/17/2024  Authorization Period Expiration: 5/8/2025  Plan of Care Expiration: 6/14/2024  Progress Note Due: 6/14/2024  Date of Surgery: NA   Visit # / Visits authorized: 8/8   FOTO: to be completed on visit 6     Precautions: Standard      Time In: 10:15  Time Out:  11:00  Total Billable Time :  45 minutes      PTA Visit #: 1/5    Subjective     Patient reports: "My right side hurts but it wasn't as tight".    She was compliant with home exercise program.  Response to previous treatment: N/A  Functional change: no significant improvements in pain    Pain: 3/10  Location: Right hip, Right lateral thigh     Objective      Objective Measures updated at progress report unless specified.     Treatment     Philomena received the treatments listed below:      therapeutic exercises to develop strength, flexibility, posture, and core stabilization for 33 minutes . See flowsheet below.      Ther-Ex Reps    Nustep 8 minutes (bike)    Wedge 2 minutes    Hamstring Stretch witch sciatic nerve glides  20 x each    Posterior Pelvic Tilts 30 x 3"    Lower Trunk Rotations 5 x 10" with ball    Single Knee to Chest Stretch 5 x 10" Each    Piriformis Stretch 3 x 30" Each    Figure 4 Stretch 3 x 30" each     Seated Hamstring stretch  2 x 30" each                        NOT COMPLETED--supervised modalities after being cleared for contradictions: Biphasic ESTIM:  Emily received  electrical stimulation for pain and tissue tightness  to the B quadratus lumborum and piriformis. Pt received burst mode at a rate of 2 pps for 10 minutes. Emily tolerated treatment well without any adverse effects.      NOT " COMPLETED---hot pack for 10 minutes to B lumbosacral muscles with ESTIM.     manual therapy techniques for 12 minutes: Joint mobilizations, soft tissue Mobilization, and active neuromuscular release were applied to the: left  TFL, Glut Min, and IT Band to decrease MYF adhesions and soft tissue tightness.      Patient Education and Home Exercises       Education provided:   - Plan of Care, Home exercise program, Delayed onset muscle soreness     Written Home Exercises Provided: Patient instructed to cont prior HEP. Exercises were reviewed and Philomena was able to demonstrate them prior to the end of the session.  Philomena demonstrated good  understanding of the education provided. See Electronic Medical Record under Patient Instructions for exercises provided during therapy sessions    Assessment   Emily is a 63 y.o. female referred to outpatient Physical Therapy with a medical diagnosis of lumbar radiculitis. Patient presents with low back pain, B lower extremity radiating symptoms, core muscle weakness, tissue tightness, and impaired motor control. Patient's impairments are currently limiting her overall activity tolerance and interfering with her sleep. LPTA  provided pt with proper setup on NuStep to decrease stiffness and improve musculoskeletal warmup prior to exercises. Pt continues to display good carryover of exercise instruction from previous sessions. Pt complains of some right hip pain during figure 4 stretch.  Pt initially reports feeling moderate discomfort during manual therapy technique applications, but pt reports decreased feeling of tightness at the end of tx. No adverse effects noted or reported.    Patient prognosis is Good.   Patient will benefit from skilled outpatient Physical Therapy to address the deficits stated above and in the chart below, provide patient /family education, and to maximize patientt's level of independence.      Plan of care discussed with patient: Yes  Patient's spiritual,  cultural and educational needs considered and patient is agreeable to the plan of care and goals as stated below:      Anticipated Barriers for therapy: chronicity of pain, biomechanical stress involved with work related tasks, presence of multilevel degenerative spine changes.     Goals:  Short Term Goals: 2 weeks   Patient will independently complete Home exercise program with correct form.    Patient will report decreased pain at worst to less than or equal to 6/10 for improved quality of life.      Long Term Goals: 4 weeks   Patient will report decreased pain to less than or equal to 5/10 at worst for improved quality of life.   Patient will report decrease in radiating occurrences from 100% of the time to less than or equal to 50% to allow patient the ability to perform activities of daily living   Patient will increase core muscle strength to 4+/5 to improve ability to complete job related tasks with less pain.  Patient will increase B hamstring 90/90 by 10 degrees    Plan   Plan of care Certification: 5/17/2024 to 6/14/2024.     Outpatient Physical Therapy 2 times weekly for 4 weeks to include the following interventions: Electrical Stimulation unattended, Manual Therapy, Moist Heat/ Ice, Patient Education, Therapeutic Activities, Therapeutic Exercise, and Ultrasound.     Plan to discharge per PT order.    STEPHIE Mckinney   6/12/2024

## 2024-06-12 NOTE — PROGRESS NOTES
[facility]  PT Discharge Note    Name: Emily Gonzalez  [facility] Number: [id number]    Therapy Diagnosis: Low back pain   Physician: Gustabo Quinonez IV, *  Physician Orders: PT Eval and Treat   Medical Diagnosis from Referral: Lumbar radiculitis  Evaluation Date: 5/17/2024  Date of Last visit: 6/12/2024  Total Visits Received: 8    ASSESSMENT    Patient had a lumbar MRI yesterday that she progression in her degenerative changes since her previous MRI last year. Patient and PT agree to discharge to Home exercise program at this time with patient planning to see a neurosurgeon soon.     Discharge reason: Other:  See assessment above     Discharge FOTO Score: 42%    Goals:   Short Term Goals: 2 weeks   Patient will independently complete Home exercise program with correct form.  -MET   Patient will report decreased pain at worst to less than or equal to 6/10 for improved quality of life. -NOT MET      Long Term Goals: 4 weeks   Patient will report decreased pain to less than or equal to 5/10 at worst for improved quality of life. -NOT MET   Patient will report decrease in radiating occurrences from 100% of the time to less than or equal to 50% to allow patient the ability to perform activities of daily living -NOT MET however patient does report decreased intensity   Patient will increase core muscle strength to 4+/5 to improve ability to complete job related tasks with less pain.MET   Patient will increase B hamstring 90/90 by 10 degrees-MET     PLAN   This patient is discharged from Physical Therapy      Marlon Goetz, PT,DPT

## 2024-06-27 DIAGNOSIS — M54.42 ACUTE LEFT-SIDED LOW BACK PAIN WITH LEFT-SIDED SCIATICA: ICD-10-CM

## 2024-06-27 RX ORDER — IBUPROFEN 800 MG/1
800 TABLET ORAL 3 TIMES DAILY
Qty: 90 TABLET | Refills: 2 | Status: SHIPPED | OUTPATIENT
Start: 2024-06-27

## 2024-07-01 DIAGNOSIS — E03.9 HYPOTHYROIDISM, UNSPECIFIED TYPE: ICD-10-CM

## 2024-07-02 DIAGNOSIS — E03.9 HYPOTHYROIDISM, UNSPECIFIED TYPE: ICD-10-CM

## 2024-07-02 RX ORDER — LEVOTHYROXINE SODIUM 100 UG/1
100 TABLET ORAL
Qty: 30 TABLET | Refills: 0 | Status: SHIPPED | OUTPATIENT
Start: 2024-07-02 | End: 2024-07-03

## 2024-07-02 RX ORDER — LEVOTHYROXINE SODIUM 100 UG/1
100 TABLET ORAL
Qty: 30 TABLET | Refills: 0 | Status: SHIPPED | OUTPATIENT
Start: 2024-07-02 | End: 2024-07-02

## 2024-07-03 RX ORDER — LEVOTHYROXINE SODIUM 100 UG/1
100 TABLET ORAL
Qty: 1 TABLET | Refills: 0 | Status: SHIPPED | OUTPATIENT
Start: 2024-07-03

## 2024-08-12 DIAGNOSIS — F32.A DEPRESSION, UNSPECIFIED DEPRESSION TYPE: ICD-10-CM

## 2024-08-12 RX ORDER — FLUOXETINE HYDROCHLORIDE 20 MG/1
20 CAPSULE ORAL EVERY MORNING
Qty: 90 CAPSULE | Refills: 3 | Status: SHIPPED | OUTPATIENT
Start: 2024-08-12

## 2024-09-02 ENCOUNTER — LAB VISIT (OUTPATIENT)
Dept: LAB | Facility: HOSPITAL | Age: 64
End: 2024-09-02
Attending: OBSTETRICS & GYNECOLOGY
Payer: COMMERCIAL

## 2024-09-02 DIAGNOSIS — G89.29 CHRONIC BILATERAL LOW BACK PAIN WITH BILATERAL SCIATICA: ICD-10-CM

## 2024-09-02 DIAGNOSIS — M54.41 CHRONIC BILATERAL LOW BACK PAIN WITH BILATERAL SCIATICA: ICD-10-CM

## 2024-09-02 DIAGNOSIS — E03.9 HYPOTHYROIDISM, UNSPECIFIED TYPE: Primary | ICD-10-CM

## 2024-09-02 DIAGNOSIS — M54.42 CHRONIC BILATERAL LOW BACK PAIN WITH BILATERAL SCIATICA: ICD-10-CM

## 2024-09-02 LAB
25(OH)D3 SERPL-MCNC: 100.7 NG/ML
ALBUMIN SERPL BCP-MCNC: 4.1 G/DL (ref 3.5–5)
ALBUMIN/GLOB SERPL: 1.3 {RATIO}
ALP SERPL-CCNC: 86 U/L (ref 50–130)
ALT SERPL W P-5'-P-CCNC: 28 U/L (ref 13–56)
ANION GAP SERPL CALCULATED.3IONS-SCNC: 14 MMOL/L (ref 7–16)
AST SERPL W P-5'-P-CCNC: 19 U/L (ref 15–37)
BASOPHILS # BLD AUTO: 0.07 K/UL (ref 0–0.2)
BASOPHILS NFR BLD AUTO: 0.7 % (ref 0–1)
BILIRUB SERPL-MCNC: 0.4 MG/DL (ref ?–1.2)
BUN SERPL-MCNC: 13 MG/DL (ref 7–18)
BUN/CREAT SERPL: 10 (ref 6–20)
CALCIUM SERPL-MCNC: 9.6 MG/DL (ref 8.5–10.1)
CHLORIDE SERPL-SCNC: 103 MMOL/L (ref 98–107)
CO2 SERPL-SCNC: 27 MMOL/L (ref 21–32)
CREAT SERPL-MCNC: 1.32 MG/DL (ref 0.55–1.02)
DIFFERENTIAL METHOD BLD: ABNORMAL
EGFR (NO RACE VARIABLE) (RUSH/TITUS): 45 ML/MIN/1.73M2
EOSINOPHIL # BLD AUTO: 0.18 K/UL (ref 0–0.5)
EOSINOPHIL NFR BLD AUTO: 1.7 % (ref 1–4)
ERYTHROCYTE [DISTWIDTH] IN BLOOD BY AUTOMATED COUNT: 13 % (ref 11.5–14.5)
ESTRADIOL SERPL-MCNC: 82.1 PG/ML
FSH SERPL-ACNC: 11.1 MIU/ML (ref 1.7–134.8)
GLOBULIN SER-MCNC: 3.1 G/DL (ref 2–4)
GLUCOSE SERPL-MCNC: 92 MG/DL (ref 74–106)
HCT VFR BLD AUTO: 43.8 % (ref 38–47)
HGB BLD-MCNC: 15 G/DL (ref 12–16)
IMM GRANULOCYTES # BLD AUTO: 0.04 K/UL (ref 0–0.04)
IMM GRANULOCYTES NFR BLD: 0.4 % (ref 0–0.4)
LYMPHOCYTES # BLD AUTO: 2.8 K/UL (ref 1–4.8)
LYMPHOCYTES NFR BLD AUTO: 26.7 % (ref 27–41)
MCH RBC QN AUTO: 31 PG (ref 27–31)
MCHC RBC AUTO-ENTMCNC: 34.2 G/DL (ref 32–36)
MCV RBC AUTO: 90.5 FL (ref 80–96)
MONOCYTES # BLD AUTO: 0.66 K/UL (ref 0–0.8)
MONOCYTES NFR BLD AUTO: 6.3 % (ref 2–6)
MPC BLD CALC-MCNC: 10.1 FL (ref 9.4–12.4)
NEUTROPHILS # BLD AUTO: 6.75 K/UL (ref 1.8–7.7)
NEUTROPHILS NFR BLD AUTO: 64.2 % (ref 53–65)
NRBC # BLD AUTO: 0 X10E3/UL
NRBC, AUTO (.00): 0 %
PLATELET # BLD AUTO: 294 K/UL (ref 150–400)
POTASSIUM SERPL-SCNC: 4.1 MMOL/L (ref 3.5–5.1)
PROT SERPL-MCNC: 7.2 G/DL (ref 6.4–8.2)
RBC # BLD AUTO: 4.84 M/UL (ref 4.2–5.4)
SODIUM SERPL-SCNC: 140 MMOL/L (ref 136–145)
T3FREE SERPL-MCNC: 2.2 PG/ML (ref 2.18–3.98)
T4 FREE SERPL-MCNC: 1.14 NG/DL (ref 0.76–1.46)
T4 SERPL-MCNC: 11.4 ΜG/DL (ref 4.8–13.9)
TESTOST SERPL-MCNC: 314 NG/DL (ref 8–60)
THYROPEROXIDASE AB SERPL-ACNC: 2049.7 U/ML (ref 0–60)
TSH SERPL DL<=0.005 MIU/L-ACNC: 0.31 UIU/ML (ref 0.36–3.74)
VIT B12 SERPL-MCNC: 387 PG/ML (ref 193–986)
WBC # BLD AUTO: 10.5 K/UL (ref 4.5–11)

## 2024-09-02 PROCEDURE — 84443 ASSAY THYROID STIM HORMONE: CPT

## 2024-09-02 PROCEDURE — 83001 ASSAY OF GONADOTROPIN (FSH): CPT

## 2024-09-02 PROCEDURE — 84403 ASSAY OF TOTAL TESTOSTERONE: CPT

## 2024-09-02 PROCEDURE — 84439 ASSAY OF FREE THYROXINE: CPT

## 2024-09-02 PROCEDURE — 36415 COLL VENOUS BLD VENIPUNCTURE: CPT

## 2024-09-02 PROCEDURE — 84481 FREE ASSAY (FT-3): CPT

## 2024-09-02 PROCEDURE — 85025 COMPLETE CBC W/AUTO DIFF WBC: CPT

## 2024-09-02 PROCEDURE — 82670 ASSAY OF TOTAL ESTRADIOL: CPT

## 2024-09-02 PROCEDURE — 84436 ASSAY OF TOTAL THYROXINE: CPT

## 2024-09-02 PROCEDURE — 82607 VITAMIN B-12: CPT

## 2024-09-02 PROCEDURE — 86376 MICROSOMAL ANTIBODY EACH: CPT

## 2024-09-02 PROCEDURE — 82306 VITAMIN D 25 HYDROXY: CPT

## 2024-09-02 PROCEDURE — 80053 COMPREHEN METABOLIC PANEL: CPT

## 2024-09-03 ENCOUNTER — TELEPHONE (OUTPATIENT)
Dept: PRIMARY CARE CLINIC | Facility: CLINIC | Age: 64
End: 2024-09-03
Payer: COMMERCIAL

## 2024-09-11 ENCOUNTER — HOSPITAL ENCOUNTER (OUTPATIENT)
Dept: RADIOLOGY | Facility: HOSPITAL | Age: 64
Discharge: HOME | End: 2024-09-11
Attending: NURSE PRACTITIONER
Payer: COMMERCIAL

## 2024-09-11 DIAGNOSIS — M53.3 SACROILIAC PAIN: ICD-10-CM

## 2024-09-11 PROCEDURE — 72170 X-RAY EXAM OF PELVIS: CPT | Mod: TC

## 2024-09-11 PROCEDURE — 72170 X-RAY EXAM OF PELVIS: CPT | Mod: 26,,, | Performed by: RADIOLOGY

## 2024-09-17 ENCOUNTER — TELEPHONE (OUTPATIENT)
Dept: PRIMARY CARE CLINIC | Facility: CLINIC | Age: 64
End: 2024-09-17
Payer: COMMERCIAL

## 2024-09-18 ENCOUNTER — TELEPHONE (OUTPATIENT)
Dept: PRIMARY CARE CLINIC | Facility: CLINIC | Age: 64
End: 2024-09-18
Payer: COMMERCIAL

## 2024-09-18 NOTE — TELEPHONE ENCOUNTER
----- Message from Stephanie Negron LPN sent at 9/18/2024  1:27 PM CDT -----    ----- Message -----  From: Soni Tijerina  Sent: 9/18/2024  11:43 AM CDT  To: [facility] Staff    Pt returned the call from Friday. She wants Dr Waite to review the lab results for her thyroid and adjust her meds accordingly. Please call her cell phone at [phone]

## 2024-09-19 ENCOUNTER — TELEPHONE (OUTPATIENT)
Dept: PRIMARY CARE CLINIC | Facility: CLINIC | Age: 64
End: 2024-09-19
Payer: COMMERCIAL

## 2024-09-24 ENCOUNTER — OFFICE VISIT (OUTPATIENT)
Dept: PRIMARY CARE CLINIC | Facility: CLINIC | Age: 64
End: 2024-09-24
Payer: COMMERCIAL

## 2024-09-24 VITALS
BODY MASS INDEX: 24.1 KG/M2 | SYSTOLIC BLOOD PRESSURE: 150 MMHG | HEART RATE: 72 BPM | WEIGHT: 159 LBS | TEMPERATURE: 98 F | DIASTOLIC BLOOD PRESSURE: 78 MMHG | HEIGHT: 68 IN | RESPIRATION RATE: 18 BRPM

## 2024-09-24 DIAGNOSIS — E03.9 HYPOTHYROIDISM, UNSPECIFIED TYPE: Primary | ICD-10-CM

## 2024-09-24 PROCEDURE — 3008F BODY MASS INDEX DOCD: CPT | Mod: CPTII,,, | Performed by: FAMILY MEDICINE

## 2024-09-24 PROCEDURE — 3078F DIAST BP <80 MM HG: CPT | Mod: CPTII,,, | Performed by: FAMILY MEDICINE

## 2024-09-24 PROCEDURE — 99212 OFFICE O/P EST SF 10 MIN: CPT | Mod: ,,, | Performed by: FAMILY MEDICINE

## 2024-09-24 PROCEDURE — 3077F SYST BP >= 140 MM HG: CPT | Mod: CPTII,,, | Performed by: FAMILY MEDICINE

## 2024-09-24 PROCEDURE — 1160F RVW MEDS BY RX/DR IN RCRD: CPT | Mod: CPTII,,, | Performed by: FAMILY MEDICINE

## 2024-09-24 PROCEDURE — 1159F MED LIST DOCD IN RCRD: CPT | Mod: CPTII,,, | Performed by: FAMILY MEDICINE

## 2024-09-24 NOTE — PROGRESS NOTES
Subjective     Patient ID: Emily Gonzalez is a 63 y.o. female.    Chief Complaint: Consult (Pt wants to talk to you about her labs.)    Pt. Gets hormone pellets. To address testosterone with GYN. Also developing symptoms of thyroiditis. Ready to see an endocrinologist.      Review of Systems   Constitutional:  Negative for fatigue and fever.   HENT:  Negative for nasal congestion and dental problem.    Eyes:  Negative for discharge.   Respiratory:  Negative for cough and shortness of breath.    Cardiovascular:  Negative for chest pain.   Gastrointestinal:  Negative for constipation, diarrhea, nausea and vomiting.   Genitourinary:  Negative for bladder incontinence, difficulty urinating and hot flashes.   Allergic/Immunologic: Negative for environmental allergies.   Neurological:  Negative for headaches.   Psychiatric/Behavioral:  Negative for behavioral problems and confusion.           Objective     Physical Exam  Vitals and nursing note reviewed.   Constitutional:       Appearance: Normal appearance. She is normal weight.   HENT:      Head: Normocephalic and atraumatic.      Comments: Hair thinning     Right Ear: Tympanic membrane normal.      Left Ear: Tympanic membrane normal.      Nose: Nose normal.      Mouth/Throat:      Mouth: Mucous membranes are moist.   Eyes:      Extraocular Movements: Extraocular movements intact.      Conjunctiva/sclera: Conjunctivae normal.      Pupils: Pupils are equal, round, and reactive to light.   Cardiovascular:      Rate and Rhythm: Normal rate and regular rhythm.      Pulses: Normal pulses.   Pulmonary:      Effort: Pulmonary effort is normal.      Breath sounds: Normal breath sounds.   Abdominal:      General: Abdomen is flat. Bowel sounds are normal.      Palpations: Abdomen is soft.   Musculoskeletal:         General: Normal range of motion.      Cervical back: Normal range of motion and neck supple.   Skin:     General: Skin is warm and dry.   Neurological:      General:  No focal deficit present.      Mental Status: She is alert and oriented to person, place, and time.   Psychiatric:         Mood and Affect: Mood normal.            Assessment and Plan     1. Hypothyroidism, unspecified type  -     Ambulatory referral/consult to Endocrinology; Future; Expected date: 12/24/2024        RTC as needed         No follow-ups on file.

## 2024-11-15 DIAGNOSIS — E03.9 HYPOTHYROIDISM, UNSPECIFIED TYPE: ICD-10-CM

## 2024-11-15 RX ORDER — LEVOTHYROXINE SODIUM 100 UG/1
100 TABLET ORAL EVERY MORNING
Qty: 100 TABLET | Refills: 0 | Status: SHIPPED | OUTPATIENT
Start: 2024-11-15

## 2024-12-26 ENCOUNTER — APPOINTMENT (OUTPATIENT)
Dept: LAB | Facility: HOSPITAL | Age: 64
End: 2024-12-26
Payer: COMMERCIAL

## 2025-01-07 ENCOUNTER — TELEPHONE (OUTPATIENT)
Dept: PRIMARY CARE CLINIC | Facility: CLINIC | Age: 65
End: 2025-01-07
Payer: COMMERCIAL

## 2025-02-17 DIAGNOSIS — E03.9 HYPOTHYROIDISM, UNSPECIFIED TYPE: ICD-10-CM

## 2025-02-17 DIAGNOSIS — F32.A DEPRESSION, UNSPECIFIED DEPRESSION TYPE: ICD-10-CM

## 2025-02-17 RX ORDER — LEVOTHYROXINE SODIUM 100 UG/1
100 TABLET ORAL EVERY MORNING
Qty: 90 TABLET | Refills: 3 | Status: SHIPPED | OUTPATIENT
Start: 2025-02-17

## 2025-02-17 RX ORDER — FLUOXETINE HYDROCHLORIDE 20 MG/1
20 CAPSULE ORAL EVERY MORNING
Qty: 90 CAPSULE | Refills: 3 | Status: SHIPPED | OUTPATIENT
Start: 2025-02-17

## 2025-02-17 NOTE — TELEPHONE ENCOUNTER
----- Message from Madina sent at 2/17/2025  1:55 PM CST -----  Prescription Refill levothyroxine (SYNTHROID) 100 MCG tabletFLUoxetine 20 MG [facility]PharmEn[facility]

## 2025-03-14 DIAGNOSIS — M53.3 SACROILIAC PAIN: Primary | ICD-10-CM

## 2025-03-24 ENCOUNTER — CLINICAL SUPPORT (OUTPATIENT)
Dept: REHABILITATION | Facility: HOSPITAL | Age: 65
End: 2025-03-24
Payer: COMMERCIAL

## 2025-03-24 DIAGNOSIS — M53.3 SACROILIAC PAIN: ICD-10-CM

## 2025-03-24 PROCEDURE — 97014 ELECTRIC STIMULATION THERAPY: CPT

## 2025-03-24 PROCEDURE — 97010 HOT OR COLD PACKS THERAPY: CPT

## 2025-03-24 PROCEDURE — 97161 PT EVAL LOW COMPLEX 20 MIN: CPT

## 2025-03-24 PROCEDURE — 97140 MANUAL THERAPY 1/> REGIONS: CPT

## 2025-03-24 NOTE — PROGRESS NOTES
Outpatient Rehab    Physical Therapy Evaluation    Patient Name: Philomena Gonzalez  MRN: [id number]  YOB: 1960  Encounter Date: 3/24/2025    Therapy Diagnosis:   Encounter Diagnosis   Name Primary?    Sacroiliac pain      Physician: Dang Patel FNP    Physician Orders: Eval and Treat  Medical Diagnosis: Sacroiliac pain    Visit # / Visits Authorized:  1 / 12  Insurance Authorization Period: 3/24/2025 to 3/14/2026  Date of Evaluation: 3/24/2025  Plan of Care Certification: 3/24/2025 to 4/18/2025     Time In: 0850   Time Out: 0930  Total Time: 40   Total Billable Time:  40 minutes     Intake Outcome Measure for FOTO Survey    Therapist reviewed FOTO scores for Philomena Gonzalez on 3/24/2025.   FOTO report - see Media section or FOTO account episode details.     Intake Score: 45%         Subjective   History of Present Illness  Philomena is a 64 y.o. female who reports to physical therapy with a chief concern of bilateral low back and hip pain.     The patient reports a medical diagnosis of sacriliac pain. The patient has experienced this issue since 03/14/25.   Diagnostic tests related to this condition: MRI studies and X-ray.   MRI Studies Details: Degenerative change of the lumbar spine with mild/moderate spinal canal and neuroforaminal narrowing as detailed above.  X-Ray Details: No evidence for pelvic fracture.  No lytic or blastic lesion.  Hip cartilage spaces are maintained.  AP view of sacroiliac joints is unremarkable.    History of Present Condition/Illness: Patient reports to PT with chronic history of low back and bilateral hip pain for approximately 2.5 years without injury. Patient describes her pain as a constant aching pain in her low back and hips that becomes sharp and burning pain that radiates into her thighs. Her pain is increased with increased activity levels or prolonged sitting. She has had multiple epidurals and SI joint injections with the most recent injection being one week  ago. Patient reports that referring MD is considering putting clamps in her SI joints.    Activities of Daily Living  Social history was obtained from Patient.               Previously independent with activities of daily living? Yes     Currently independent with activities of daily living? Yes          Previously independent with instrumental activities of daily living? Yes     Currently independent with instrumental activities of daily living? Yes              Pain     Patient reports a current pain level of 3/10. Pain at best is reported as 3/10. Pain at worst is reported as 7/10.   Location: bilateral low back, hips and thighs  Clinical Progression (since onset): Unchanged  Pain Qualities: Burning, Aching, Sharp  Pain-Relieving Factors: Activity modification, Heat, Medications - prescription, Rest  Pain-Aggravating Factors: Bending, Cooking, Lifting, Sitting, Standing         Treatment History  Treatments  Previously Received Treatments: Yes  Previous Treatments: Physical therapy, Medications - prescription, Exercise, Heat    Living Arrangements  Living Situation  Housing: Home independently  Support Systems: Family members        Employment  Employment Status: Employed part-time          Past Medical History/Physical Systems Review:   Emily Gonzalez  has a past medical history of Hypothyroidism.    Emily Gonzalez  has a past surgical history that includes Hysterectomy; Breast surgery; and Cholecystectomy.    Emily has a current medication list which includes the following prescription(s): fluoxetine, hydrocodone-acetaminophen, ibuprofen, levothyroxine, ondansetron, pregabalin, progesterone, and temazepam.    Review of patient's allergies indicates:  No Known Allergies     Objective   Posture          Pelvic tilt observed: Anterior  Right pelvis characteristics: Posterior Superior Iliac Spine Higher and Ilium Higher              Spinal Mobility  Hypermobile: Lumbosacral  Hypomobile: Thoracic       Spinal Muscle  Palpation     Patient has increase tissue tightness and report tenderness on palpation of R piriformis, glute medius, and quadratus lumborum                Lumbar Range of Motion   Patient has full lumbar active range of motion in all planes                  Hip Strength - Planes of Motion   Right Strength Right Pain Left Strength Left  Pain   Flexion (L2) 4   4     Extension 4   4     ABduction 4   4     ADduction 4   4     Internal Rotation 4   4     External Rotation 4   4             Lumbar/Pelvic Girdle Special Tests       Lumbar Tests - SLR and Tension  Negative: Right Passive Straight Leg Raise, Left Passive Straight Leg Raise, Right Crossed Straight Leg Raise, and Left Crossed Straight Leg Raise                 Pelvic Girdle / Sacrum Tests  Positive: Right DANI, Left DANI, Right Gaenslen's, Right Sacroiliac Compression, and Left Sacroiliac Compression  Negative: Right Gapping and Left Gapping  Positive: Right Thigh Thrust/Posterior Shear  Sciatic nerve tension: positive bilaterally       Hip Special Tests  Intra-Articular/Impingement Tests  Positive: Right DANI and Left DANI  Sacroiliac Joint Tests  Positive: Right Gaenslen's, Right Compression, Left Compression, and Right Thigh Thrust/Posterior Shear              Treatment:  Manual Therapy  MT 1: active neuromusuclar releases to R piriformis,glute medius, and qaudratus lumborum  MT 2: lumbar and SI joint mobilizations  MT 3: R innominate upslip manual correction  MT 4: Manual lumbar rotation stretch and SI joint gapping  Modalities  Moist Heat (min): 8 minutes with ESTIM  Electrical Stimulation (Parameters): Biphasic ESTIM to B lumbar paraspinals and piriformis at 2 pps for 8 minutes    Time Entry(in minutes):  PT Evaluation (Low) Time Entry: 16  E-Stim (Unattended) Time Entry: 8  Hot/Cold Pack Time Entry: 8 (with ESTIM)  Manual Therapy Time Entry: 16    Assessment & Plan   Assessment  Philomena presents with a condition of Low complexity.   Presentation  of Symptoms: Stable       Functional Limitations: Activity tolerance, Completing work/school activities, Decreased ambulation distance/endurance, Disrupted sleep pattern, Pain with ADLs/IADLs, Painful locomotion/ambulation, Participating in leisure activities, Standing tolerance, Sitting tolerance  Impairments: Abnormal coordination, Impaired physical strength, Pain with functional activity    Prognosis: Good    Plan  From a physical therapy perspective, the patient would benefit from: Skilled Rehab Services    Planned therapy interventions include: Therapeutic exercise, Therapeutic activities, Neuromuscular re-education, and Manual therapy.    Planned modalities to include: Cryotherapy (cold pack), Electrical stimulation - passive/unattended, and Thermotherapy (hot pack).        Visit Frequency: 3 times Per Week for 4 Weeks.       This plan was discussed with Patient.   Discussion participants: Agreed Upon Plan of Care             Patient's spiritual, cultural, and educational needs considered and patient agreeable to plan of care and goals.     Education  Education was done with Patient. The patient's learning style includes Demonstration, Listening, and Pictures/video. The patient Demonstrates understanding and Verbalizes understanding.                 Goals:   Active       Long Term Goals        Patient will report decreased pain at worst to less than or equal to 4/10 for improved quality of life.        Start:  03/24/25    Expected End:  04/18/25            Patient will have increased FOTO score to greater than or equal to 55% indicating increased function.        Start:  03/24/25    Expected End:  04/18/25            Patient will have increased B hip strength to greater than or equal to 4+/5.       Start:  03/24/25    Expected End:  04/18/25            Patient will report decreased frequency of B lower extremity radicular pain by 40% indicating improved quality of life.        Start:  03/24/25    Expected End:   04/18/25               Short Term Goals        Patient will independently complete Home exercise program with correct form.         Start:  03/24/25    Expected End:  04/18/25            Patient will report decreased pain at worst to less than or equal to 5/10 for improved quality of life.        Start:  03/24/25    Expected End:  04/18/25                Marlon Goetz PT, DPT    Physician Signature : ____________________________________________________  Date:_______________________________________________

## 2025-03-24 NOTE — LETTER
March 24, 2025  KALANI Davison  [address] [place] [address] [place] [id number]    To whom it may concern,     The attached plan of care is being sent to you for review and reference.    You may indicate your approval by signing the document electronically, or by faxing/mailing a signed copy of the final page of this document back to the attention of Marlon Goetz PT, DPT:         Plan of Care 3/24/25   Effective from: 3/24/2025  Effective to: 4/18/2025    Active  Plan ID: [id number]           Participants as of 3/24/2025    Name Type Comments Contact Info    KALANI Davison Referring Provider  [phone]    Marlon Goetz PT, DPT Physical Therapist        Last Plan Note     Author: Marlon Goetz PT, DPT Status: Addendum Last edited: 3/24/2025  8:45 AM         Outpatient Rehab    Physical Therapy Evaluation    Patient Name: Philomena Gonzalez  MRN: [id number]  YOB: 1960  Encounter Date: 3/24/2025    Therapy Diagnosis:   Encounter Diagnosis   Name Primary?    Sacroiliac pain      Physician: Dang Patel FNP    Physician Orders: Eval and Treat  Medical Diagnosis: Sacroiliac pain    Visit # / Visits Authorized:  1 / 12  Insurance Authorization Period: 3/24/2025 to 3/14/2026  Date of Evaluation: 3/24/2025  Plan of Care Certification: 3/24/2025 to 4/18/2025     Time In: 0850   Time Out: 0930  Total Time: 40   Total Billable Time:  40 minutes     Intake Outcome Measure for FOTO Survey    Therapist reviewed FOTO scores for Philomena Gonzalez on 3/24/2025.   FOTO report - see Media section or FOTO account episode details.     Intake Score: 45%         Subjective   History of Present Illness  Philomena is a 64 y.o. female who reports to physical therapy with a chief concern of bilateral low back and hip pain.     The patient reports a medical diagnosis of sacriliac pain. The patient has experienced this issue since 03/14/25.   Diagnostic tests related to this condition: MRI studies and X-ray.    MRI Studies Details: Degenerative change of the lumbar spine with mild/moderate spinal canal and neuroforaminal narrowing as detailed above.  X-Ray Details: No evidence for pelvic fracture.  No lytic or blastic lesion.  Hip cartilage spaces are maintained.  AP view of sacroiliac joints is unremarkable.    History of Present Condition/Illness: Patient reports to PT with chronic history of low back and bilateral hip pain for approximately 2.5 years without injury. Patient describes her pain as a constant aching pain in her low back and hips that becomes sharp and burning pain that radiates into her thighs. Her pain is increased with increased activity levels or prolonged sitting. She has had multiple epidurals and SI joint injections with the most recent injection being one week ago. Patient reports that referring MD is considering putting clamps in her SI joints.    Activities of Daily Living  Social history was obtained from Patient.               Previously independent with activities of daily living? Yes     Currently independent with activities of daily living? Yes          Previously independent with instrumental activities of daily living? Yes     Currently independent with instrumental activities of daily living? Yes              Pain     Patient reports a current pain level of 3/10. Pain at best is reported as 3/10. Pain at worst is reported as 7/10.   Location: bilateral low back, hips and thighs  Clinical Progression (since onset): Unchanged  Pain Qualities: Burning, Aching, Sharp  Pain-Relieving Factors: Activity modification, Heat, Medications - prescription, Rest  Pain-Aggravating Factors: Bending, Cooking, Lifting, Sitting, Standing         Treatment History  Treatments  Previously Received Treatments: Yes  Previous Treatments: Physical therapy, Medications - prescription, Exercise, Heat    Living Arrangements  Living Situation  Housing: Home independently  Support Systems: Family  members        Employment  Employment Status: Employed part-time          Past Medical History/Physical Systems Review:   Emily Gonzalez  has a past medical history of Hypothyroidism.    Emily Gonzalez  has a past surgical history that includes Hysterectomy; Breast surgery; and Cholecystectomy.    Emily has a current medication list which includes the following prescription(s): fluoxetine, hydrocodone-acetaminophen, ibuprofen, levothyroxine, ondansetron, pregabalin, progesterone, and temazepam.    Review of patient's allergies indicates:  No Known Allergies     Objective   Posture          Pelvic tilt observed: Anterior  Right pelvis characteristics: Posterior Superior Iliac Spine Higher and Ilium Higher              Spinal Mobility  Hypermobile: Lumbosacral  Hypomobile: Thoracic       Spinal Muscle Palpation     Patient has increase tissue tightness and report tenderness on palpation of R piriformis, glute medius, and quadratus lumborum                Lumbar Range of Motion   Patient has full lumbar active range of motion in all planes                  Hip Strength - Planes of Motion   Right Strength Right Pain Left Strength Left  Pain   Flexion (L2) 4   4     Extension 4   4     ABduction 4   4     ADduction 4   4     Internal Rotation 4   4     External Rotation 4   4             Lumbar/Pelvic Girdle Special Tests       Lumbar Tests - SLR and Tension  Negative: Right Passive Straight Leg Raise, Left Passive Straight Leg Raise, Right Crossed Straight Leg Raise, and Left Crossed Straight Leg Raise                 Pelvic Girdle / Sacrum Tests  Positive: Right DANI, Left DANI, Right Gaenslen's, Right Sacroiliac Compression, and Left Sacroiliac Compression  Negative: Right Gapping and Left Gapping  Positive: Right Thigh Thrust/Posterior Shear  Sciatic nerve tension: positive bilaterally       Hip Special Tests  Intra-Articular/Impingement Tests  Positive: Right DANI and Left DANI  Sacroiliac Joint Tests  Positive:  Right Gaenslen's, Right Compression, Left Compression, and Right Thigh Thrust/Posterior Shear              Treatment:  Manual Therapy  MT 1: active neuromusuclar releases to R piriformis,glute medius, and qaudratus lumborum  MT 2: lumbar and SI joint mobilizations  MT 3: R innominate upslip manual correction  MT 4: Manual lumbar rotation stretch and SI joint gapping  Modalities  Moist Heat (min): 8 minutes with ESTIM  Electrical Stimulation (Parameters): Biphasic ESTIM to B lumbar paraspinals and piriformis at 2 pps for 8 minutes    Time Entry(in minutes):  PT Evaluation (Low) Time Entry: 16  E-Stim (Unattended) Time Entry: 8  Hot/Cold Pack Time Entry: 8 (with ESTIM)  Manual Therapy Time Entry: 16    Assessment & Plan   Assessment  Philomena presents with a condition of Low complexity.   Presentation of Symptoms: Stable       Functional Limitations: Activity tolerance, Completing work/school activities, Decreased ambulation distance/endurance, Disrupted sleep pattern, Pain with ADLs/IADLs, Painful locomotion/ambulation, Participating in leisure activities, Standing tolerance, Sitting tolerance  Impairments: Abnormal coordination, Impaired physical strength, Pain with functional activity    Prognosis: Good    Plan  From a physical therapy perspective, the patient would benefit from: Skilled Rehab Services    Planned therapy interventions include: Therapeutic exercise, Therapeutic activities, Neuromuscular re-education, and Manual therapy.    Planned modalities to include: Cryotherapy (cold pack), Electrical stimulation - passive/unattended, and Thermotherapy (hot pack).        Visit Frequency: 3 times Per Week for 4 Weeks.       This plan was discussed with Patient.   Discussion participants: Agreed Upon Plan of Care             Patient's spiritual, cultural, and educational needs considered and patient agreeable to plan of care and goals.     Education  Education was done with Patient. The patient's learning style  includes Demonstration, Listening, and Pictures/video. The patient Demonstrates understanding and Verbalizes understanding.                 Goals:   Active       Long Term Goals        Patient will report decreased pain at worst to less than or equal to 4/10 for improved quality of life.        Start:  03/24/25    Expected End:  04/18/25            Patient will have increased FOTO score to greater than or equal to 55% indicating increased function.        Start:  03/24/25    Expected End:  04/18/25            Patient will have increased B hip strength to greater than or equal to 4+/5.       Start:  03/24/25    Expected End:  04/18/25            Patient will report decreased frequency of B lower extremity radicular pain by 40% indicating improved quality of life.        Start:  03/24/25    Expected End:  04/18/25               Short Term Goals        Patient will independently complete Home exercise program with correct form.         Start:  03/24/25    Expected End:  04/18/25            Patient will report decreased pain at worst to less than or equal to 5/10 for improved quality of life.        Start:  03/24/25    Expected End:  04/18/25                Marlon Goetz, PT, DPT    Physician Signature : ____________________________________________________  Date:_______________________________________________                 Sincerely,      Marlon Goetz, PT, DPT  [facility]                                                            Dear Marlon Goetz, PT, DPT,    RE: Ms. Emily Gonzalez, MRN: [id number]    I certify that I have reviewed the attached plan of care and agree to the details within.        ___________________________  ___________________________  Provider Printed Name   Provider Signed Name      ___________________________  Date and Time Same Histology In Subsequent Stages Text: The pattern and morphology of the tumor is as described in the first stage.

## 2025-03-25 ENCOUNTER — CLINICAL SUPPORT (OUTPATIENT)
Dept: REHABILITATION | Facility: HOSPITAL | Age: 65
End: 2025-03-25
Payer: COMMERCIAL

## 2025-03-25 DIAGNOSIS — M53.3 SACROILIAC PAIN: Primary | ICD-10-CM

## 2025-03-25 DIAGNOSIS — M54.16 LUMBAR RADICULITIS: ICD-10-CM

## 2025-03-25 PROCEDURE — 97140 MANUAL THERAPY 1/> REGIONS: CPT | Mod: CQ

## 2025-03-25 PROCEDURE — 97014 ELECTRIC STIMULATION THERAPY: CPT | Mod: CQ

## 2025-03-25 PROCEDURE — 97110 THERAPEUTIC EXERCISES: CPT | Mod: CQ

## 2025-03-25 NOTE — PROGRESS NOTES
"  Outpatient Rehab    Physical Therapy Visit    Patient Name: Philomena Gonzalez  MRN: [id number]  YOB: 1960  Encounter Date: 3/25/2025    Therapy Diagnosis: No diagnosis found.  Physician: Dang Patel FNP    Physician Orders: Eval and Treat  Medical Diagnosis: Sacroiliac pain    Visit # / Visits Authorized:  1 / 12  Insurance Authorization Period: 3/24/2025 to 3/5/2027  Date of Evaluation: 3/24/2025  Plan of Care Certification: 3/24/2025 to 4/18/2025     PT/PTA:   PTA  Number of PTA visits since last PT visit: 1/5  Time In:   8:47  Time Out:  9:41  Total Time:   54  Total Billable Time:  45 minutes     FOTO:  Intake Score:  %  Survey Score 1:  %  Survey Score 2:  %         Subjective   Patient reports feeling pain and soreness "all the way across my back", but it feels like its hilliard on one side of my butt cheek than the other"..  Pain reported as 4/10. low back, upper hips bilateral posterior    Objective            Treatment:  Therapeutic Exercise  TE 1: Bikem 8 minutes  TE 2: Seated HS stretch 3 x 30" each LE  TE 3: supine PPTs  TE 4: Supine LTRs 20 x 3"  TE 5: Supine single knee to chest 5 x 10"  TE 6: Supine Piriformis Stretch 3 x 30"  TE 7: Supine Figure Four stretch 3 x 30"  Manual Therapy  MT 1: STM/MFR including ANR's to decrease trigger points and muscle tension in Left and Right Quadratus Lumborum, Left and Right Glut Min, Left and Right Piriformis, Left and Right TFL  Modalities  Moist Heat (min): concurrent with ESTIM  Electrical Stimulation (Parameters): Biphasic Estim to Left and Right Quadratus Lumborum and Left and Right Piriformis with patient positioned supine x 12 minutes    Time Entry(in minutes):       Assessment & Plan   Assessment: Patient able to progress through completion of Therapeutic exercises with verbal and visual cues for proper form, alignment, speed of movement, count, and hold times.  Patient reports "feeling better" and rates pain in bilateral low back and " hips decreased to 2/10 at end of PT treamtent session.       Patient will continue to benefit from skilled outpatient physical therapy to address the deficits listed in the problem list box on initial evaluation, provide pt/family education and to maximize pt's level of independence in the home and community environment.     Patient's spiritual, cultural, and educational needs considered and patient agreeable to plan of care and goals.     Education  Education was done with Patient. The patient's learning style includes Demonstration and Listening. The patient Demonstrates understanding and Verbalizes understanding.         Plan of Care; Home exercise program; Delayed onset muscle soreness        Plan: Continue POC per PT order to progress patient toward rehab goals as tolerated by patient.    Goals:   Active       Long Term Goals        Patient will report decreased pain at worst to less than or equal to 4/10 for improved quality of life.        Start:  03/24/25    Expected End:  04/18/25            Patient will have increased FOTO score to greater than or equal to 55% indicating increased function.        Start:  03/24/25    Expected End:  04/18/25            Patient will have increased B hip strength to greater than or equal to 4+/5.       Start:  03/24/25    Expected End:  04/18/25            Patient will report decreased frequency of B lower extremity radicular pain by 40% indicating improved quality of life.        Start:  03/24/25    Expected End:  04/18/25               Short Term Goals        Patient will independently complete Home exercise program with correct form.         Start:  03/24/25    Expected End:  04/18/25            Patient will report decreased pain at worst to less than or equal to 5/10 for improved quality of life.        Start:  03/24/25    Expected End:  04/18/25                Halie Estrada, PTA 3/25/2025

## 2025-03-26 ENCOUNTER — LAB VISIT (OUTPATIENT)
Dept: LAB | Facility: HOSPITAL | Age: 65
End: 2025-03-26
Attending: OBSTETRICS & GYNECOLOGY
Payer: COMMERCIAL

## 2025-03-26 DIAGNOSIS — G47.00 PERSISTENT DISORDER OF INITIATING OR MAINTAINING SLEEP: ICD-10-CM

## 2025-03-26 DIAGNOSIS — N95.1 SYMPTOMATIC MENOPAUSAL OR FEMALE CLIMACTERIC STATES: ICD-10-CM

## 2025-03-26 DIAGNOSIS — R53.83 FATIGUE, UNSPECIFIED TYPE: Primary | ICD-10-CM

## 2025-03-26 LAB
ESTRADIOL SERPL-MCNC: 106 PG/ML
FSH SERPL-ACNC: 5.1 MIU/ML
TESTOST SERPL-MCNC: 186.5 NG/DL (ref 12.4–35.8)

## 2025-03-26 PROCEDURE — 84403 ASSAY OF TOTAL TESTOSTERONE: CPT

## 2025-03-26 PROCEDURE — 82670 ASSAY OF TOTAL ESTRADIOL: CPT

## 2025-03-26 PROCEDURE — 83001 ASSAY OF GONADOTROPIN (FSH): CPT

## 2025-03-26 PROCEDURE — 36415 COLL VENOUS BLD VENIPUNCTURE: CPT

## 2025-03-28 ENCOUNTER — CLINICAL SUPPORT (OUTPATIENT)
Dept: REHABILITATION | Facility: HOSPITAL | Age: 65
End: 2025-03-28
Payer: COMMERCIAL

## 2025-03-28 DIAGNOSIS — M54.42 CHRONIC BILATERAL LOW BACK PAIN WITH BILATERAL SCIATICA: ICD-10-CM

## 2025-03-28 DIAGNOSIS — M54.41 CHRONIC BILATERAL LOW BACK PAIN WITH BILATERAL SCIATICA: ICD-10-CM

## 2025-03-28 DIAGNOSIS — G89.29 CHRONIC BILATERAL LOW BACK PAIN WITH BILATERAL SCIATICA: ICD-10-CM

## 2025-03-28 DIAGNOSIS — M79.605 PAIN IN LEFT LEG: ICD-10-CM

## 2025-03-28 DIAGNOSIS — M54.51 VERTEBROGENIC LOW BACK PAIN: Primary | ICD-10-CM

## 2025-03-28 DIAGNOSIS — M79.604 PAIN IN RIGHT LEG: ICD-10-CM

## 2025-03-28 PROCEDURE — 97110 THERAPEUTIC EXERCISES: CPT

## 2025-03-28 PROCEDURE — 97140 MANUAL THERAPY 1/> REGIONS: CPT

## 2025-03-28 NOTE — PROGRESS NOTES
"  Outpatient Rehab    Physical Therapy Visit    Patient Name: Philomena Gonzalez  MRN: [id number]  YOB: 1960  Encounter Date: 3/28/2025    Therapy Diagnosis:   Encounter Diagnoses   Name Primary?    Vertebrogenic low back pain Yes    Pain in right leg     Pain in left leg     Chronic bilateral low back pain with bilateral sciatica      Physician: Dang Patel FNP    Physician Orders: Eval and Treat  Medical Diagnosis: Sacroiliac pain    Visit # / Visits Authorized:  3 / 12  Insurance Authorization Period: 3/24/2025 to 3/5/2027  Date of Evaluation: 3/24/2025  Plan of Care Certification: 3/24/2025 to 4/18/2025     PT/PTA: PT   Number of PTA visits since last PT visit:0  Time In: 0847   Time Out: 0928  Total Time: 41   Total Billable Time: 41    FOTO:  Intake Score:  %  Survey Score 1:  %  Survey Score 2:  %         Subjective   Pt reports that her pain is located along her lower back, B hips, and B thighs. She says that the L side has been more problematic here recently..  Pain reported as 4/10.      Objective            Treatment:  Therapeutic Exercise  TE 1: Bike 6 minutes  TE 2: Seated HS stretch 3 x 30" each LE  TE 5: Supine single knee to chest 3 x 20"  TE 6: Supine Piriformis Stretch 3 x 20"  TE 7: child's pose, knees wide: 3 x 20 sec hold  Manual Therapy  MT 1: manual stretching to BLE: knee flexion, hip ext, hip ER      Time Entry(in minutes):  Manual Therapy Time Entry: 10  Therapeutic Exercise Time Entry: 31    Assessment & Plan   Assessment: Added in some new manual stretching to BLE todya with good tolerance. Did have to place pillow under midsection while pt was prone to decrease pain in the lower back. Continued with other exercises/activities from previous treatment session with good tolernace. Pt opted to skip modalities today. Will continue to progress as able and as tolerated in coming visits.       Patient will continue to benefit from skilled outpatient physical therapy to address " the deficits listed in the problem list box on initial evaluation, provide pt/family education and to maximize pt's level of independence in the home and community environment.     Patient's spiritual, cultural, and educational needs considered and patient agreeable to plan of care and goals.           Plan: Continue POC per PT order to progress patient toward rehab goals as tolerated by patient.    Goals:   Active       Long Term Goals        Patient will report decreased pain at worst to less than or equal to 4/10 for improved quality of life.        Start:  03/24/25    Expected End:  04/18/25            Patient will have increased FOTO score to greater than or equal to 55% indicating increased function.        Start:  03/24/25    Expected End:  04/18/25            Patient will have increased B hip strength to greater than or equal to 4+/5.       Start:  03/24/25    Expected End:  04/18/25            Patient will report decreased frequency of B lower extremity radicular pain by 40% indicating improved quality of life.        Start:  03/24/25    Expected End:  04/18/25               Short Term Goals        Patient will independently complete Home exercise program with correct form.         Start:  03/24/25    Expected End:  04/18/25            Patient will report decreased pain at worst to less than or equal to 5/10 for improved quality of life.        Start:  03/24/25    Expected End:  04/18/25                Danilo Roman, PT, DPT  3/28/2025

## 2025-04-02 ENCOUNTER — CLINICAL SUPPORT (OUTPATIENT)
Dept: REHABILITATION | Facility: HOSPITAL | Age: 65
End: 2025-04-02
Payer: COMMERCIAL

## 2025-04-02 DIAGNOSIS — M54.51 VERTEBROGENIC LOW BACK PAIN: Primary | ICD-10-CM

## 2025-04-02 DIAGNOSIS — G89.29 CHRONIC BILATERAL LOW BACK PAIN WITH BILATERAL SCIATICA: ICD-10-CM

## 2025-04-02 DIAGNOSIS — M53.3 SACROILIAC PAIN: ICD-10-CM

## 2025-04-02 DIAGNOSIS — M54.41 CHRONIC BILATERAL LOW BACK PAIN WITH BILATERAL SCIATICA: ICD-10-CM

## 2025-04-02 DIAGNOSIS — M54.42 CHRONIC BILATERAL LOW BACK PAIN WITH BILATERAL SCIATICA: ICD-10-CM

## 2025-04-02 DIAGNOSIS — M54.16 LUMBAR RADICULITIS: ICD-10-CM

## 2025-04-02 PROCEDURE — 97140 MANUAL THERAPY 1/> REGIONS: CPT | Mod: CQ

## 2025-04-02 PROCEDURE — 97010 HOT OR COLD PACKS THERAPY: CPT | Mod: CQ

## 2025-04-02 PROCEDURE — 97110 THERAPEUTIC EXERCISES: CPT | Mod: CQ

## 2025-04-02 PROCEDURE — 97014 ELECTRIC STIMULATION THERAPY: CPT | Mod: CQ

## 2025-04-02 NOTE — PROGRESS NOTES
"  Outpatient Rehab    Physical Therapy Visit    Patient Name: Philomena Gonzalez  MRN: [id number]  YOB: 1960  Encounter Date: 4/2/2025    Therapy Diagnosis:   No diagnosis found.    Physician: Dang Patel FNP    Physician Orders: Eval and Treat  Medical Diagnosis: Sacroiliac pain    Visit # / Visits Authorized:  4 / 12  Insurance Authorization Period: 3/24/2025 to 3/5/2027  Date of Evaluation: 3/24/2025  Plan of Care Certification: 3/24/2025 to 4/18/2025     PT/PTA:   PTA  Number of PTA visits since last PT visit: 1/5  Time In:   10:13  Time Out:  11:10  Total Time:   57 minutes   Total Billable Time:  57 minutes     FOTO:  Intake Score:  %  Survey Score 1:  %  Survey Score 2:  %         Subjective   Patient reports "The pain is always there".  Patient complains of pain in low back and down into Right posterior hip and LE.  Patient reports pain management doctors are wanting to "put clamps" into her SI joints and patient is hesitant to go that route..  Pain reported as 3/10. low back, upper hips bilateral posterior. Right LE    Objective            Treatment:  Therapeutic Exercise  TE 1: Bike 8 minutes, intervals  TE 2: Seated HS stretch 3 x 30" each LE  TE 3: supine PPTs  TE 4: Supine LTRs 20 x 3"  TE 5: Supine single knee to chest 3 x 20"  TE 6: Supine Piriformis Stretch 3 x 20"  TE 7: child's pose, knees wide: 3 x 20 sec hold  Manual Therapy  MT 1: STM/MFR to Right Quadratus Lumborum, Right Piriformis, Right Glut Min, and Right TFL  MT 3: R innominate upslip manual correction with muscle energy technique and ANR's  Modalities  Moist Heat (min): concurrent with ESTIM  Electrical Stimulation (Parameters): Biphasic Estim to Left and Right Quadratus Lumborum and Left and Right Piriformis with patient positioned supine x 12 minutes      Time Entry(in minutes):  E-Stim (Unattended) Time Entry: 12  Hot/Cold Pack Time Entry: 10  Manual Therapy Time Entry: 10  Therapeutic Exercise Time Entry: " 35    Assessment & Plan   Assessment: Patient demonstrates good carryover and knowledge progress through Therapeutic exercises and stretches with proper form, count, and hold times.  Patient initially reports feeling discomfort and tenderness with manual therapy techiques, but rates pain decreased to 2/10 at end of PT treatment session.  No adverse effects noted or reported.       Patient will continue to benefit from skilled outpatient physical therapy to address the deficits listed in the problem list box on initial evaluation, provide pt/family education and to maximize pt's level of independence in the home and community environment.     Patient's spiritual, cultural, and educational needs considered and patient agreeable to plan of care and goals.     Education  Education was done with Patient. The patient's learning style includes Demonstration and Listening. The patient Demonstrates understanding and Verbalizes understanding.         Plan of Care, Home exercise program, Delayed onset muscle soreness        Plan: Continue POC per PT order to progress patient toward rehab goals as tolerated by patient.    Goals:   Active       Long Term Goals        Patient will report decreased pain at worst to less than or equal to 4/10 for improved quality of life.        Start:  03/24/25    Expected End:  04/18/25            Patient will have increased FOTO score to greater than or equal to 55% indicating increased function.        Start:  03/24/25    Expected End:  04/18/25            Patient will have increased B hip strength to greater than or equal to 4+/5.       Start:  03/24/25    Expected End:  04/18/25            Patient will report decreased frequency of B lower extremity radicular pain by 40% indicating improved quality of life.        Start:  03/24/25    Expected End:  04/18/25               Short Term Goals        Patient will independently complete Home exercise program with correct form.         Start:   03/24/25    Expected End:  04/18/25            Patient will report decreased pain at worst to less than or equal to 5/10 for improved quality of life.        Start:  03/24/25    Expected End:  04/18/25                Halie Estrada PTA  4/2/2025

## 2025-04-03 ENCOUNTER — CLINICAL SUPPORT (OUTPATIENT)
Dept: REHABILITATION | Facility: HOSPITAL | Age: 65
End: 2025-04-03
Payer: COMMERCIAL

## 2025-04-03 DIAGNOSIS — M54.51 VERTEBROGENIC LOW BACK PAIN: Primary | ICD-10-CM

## 2025-04-03 DIAGNOSIS — M54.16 LUMBAR RADICULITIS: ICD-10-CM

## 2025-04-03 DIAGNOSIS — M53.3 SACROILIAC PAIN: ICD-10-CM

## 2025-04-03 DIAGNOSIS — M79.605 PAIN IN LEFT LEG: ICD-10-CM

## 2025-04-03 DIAGNOSIS — M54.42 CHRONIC BILATERAL LOW BACK PAIN WITH BILATERAL SCIATICA: ICD-10-CM

## 2025-04-03 DIAGNOSIS — M79.604 PAIN IN RIGHT LEG: ICD-10-CM

## 2025-04-03 DIAGNOSIS — G89.29 CHRONIC BILATERAL LOW BACK PAIN WITH BILATERAL SCIATICA: ICD-10-CM

## 2025-04-03 DIAGNOSIS — M54.41 CHRONIC BILATERAL LOW BACK PAIN WITH BILATERAL SCIATICA: ICD-10-CM

## 2025-04-03 PROCEDURE — 97110 THERAPEUTIC EXERCISES: CPT | Mod: CQ

## 2025-04-03 PROCEDURE — 97112 NEUROMUSCULAR REEDUCATION: CPT | Mod: CQ

## 2025-04-03 NOTE — PROGRESS NOTES
"  Outpatient Rehab    Physical Therapy Visit    Patient Name: Philomena Gonzalez  MRN: [id number]  YOB: 1960  Encounter Date: 4/3/2025    Therapy Diagnosis:   Encounter Diagnoses   Name Primary?    Vertebrogenic low back pain Yes    Chronic bilateral low back pain with bilateral sciatica     Lumbar radiculitis     Sacroiliac pain     Pain in right leg     Pain in left leg      Physician: Dang Patel FNP    Physician Orders: Eval and Treat  Medical Diagnosis: Sacroiliac pain      Visit # / Visits Authorized:  5 / 12  Insurance Authorization Period: 3/24/2025 to 3/5/2027  Date of Evaluation: 3/24/2025  Plan of Care Certification: 3/24/2025 to 4/18/2025     PT/PTA:     Number of PTA visits since last PT visit: 2/5  Time In: 1410   Time Out: 1440  Total Time: 30   Total Billable Time: 30    FOTO:  Intake Score:  %  Survey Score 1:  %  Survey Score 2:  %    Received Plan of Care per Danilo Roman PT        Subjective   pain is a little higher today as noted; has taken her Rx meds, but nothing taken specific for pain (Rx or OTC) except pain cream/patches.  Pain reported as 4/10. low back, B SIJ, BLEs    Objective            Treatment:  Therapeutic Exercise  TE 1: Bike 8 minutes  TE 2: Seated HS stretch 3 x 30" each LE (Not today)  TE 3: supine PPTs w/ exercises  TE 4: Supine LTRs 20 x 3" (see below)  TE 5: Supine single knee to chest w/ alternating hamstring stretch w/ neural glide 3 x 20" each, BLE  TE 6: Supine Piriformis Stretch w/ alternating hip ER stretch 3 x 20" each, BLE  TE 7: child's pose, knees wide: 3 x 20 sec hold (not today)  Balance/Neuromuscular Re-Education  NMR 1: supine swiss ball BLE hamstring rolling and trunk rotation x 20 reps each w/ PPT for core stabilization  NMR 2: hooklying hip abd x 20 reps w/ PPT for core stabilization, blue band  NMR 3: bridging w/ hip adduction isometrics x 20 reps  Modalities  Moist Heat (min): concurrent with ESTIM (not today)  Electrical Stimulation " (Parameters): Biphasic Estim to Left and Right Quadratus Lumborum and Left and Right Piriformis with patient positioned supine x 12 minutes (not today)    Time Entry(in minutes):  Neuromuscular Re-Education Time Entry: 10  Therapeutic Exercise Time Entry: 20    Assessment & Plan   Assessment: more sore and painful today, therefore, more conservative treatment; pt declined modalities, however.  Evaluation/Treatment Tolerance: Patient tolerated treatment well    Patient will continue to benefit from skilled outpatient physical therapy to address the deficits listed in the problem list box on initial evaluation, provide pt/family education and to maximize pt's level of independence in the home and community environment.        Plan: Continue per POC and progress as pt able    Goals:   Active       Long Term Goals        Patient will report decreased pain at worst to less than or equal to 4/10 for improved quality of life.        Start:  03/24/25    Expected End:  04/18/25            Patient will have increased FOTO score to greater than or equal to 55% indicating increased function.        Start:  03/24/25    Expected End:  04/18/25            Patient will have increased B hip strength to greater than or equal to 4+/5.       Start:  03/24/25    Expected End:  04/18/25            Patient will report decreased frequency of B lower extremity radicular pain by 40% indicating improved quality of life.        Start:  03/24/25    Expected End:  04/18/25               Short Term Goals        Patient will independently complete Home exercise program with correct form.         Start:  03/24/25    Expected End:  04/18/25            Patient will report decreased pain at worst to less than or equal to 5/10 for improved quality of life.        Start:  03/24/25    Expected End:  04/18/25                Shelly Cavazos, PTA

## 2025-04-04 ENCOUNTER — CLINICAL SUPPORT (OUTPATIENT)
Dept: REHABILITATION | Facility: HOSPITAL | Age: 65
End: 2025-04-04
Payer: COMMERCIAL

## 2025-04-04 DIAGNOSIS — M79.604 PAIN IN RIGHT LEG: ICD-10-CM

## 2025-04-04 DIAGNOSIS — G89.29 CHRONIC BILATERAL LOW BACK PAIN WITH BILATERAL SCIATICA: Primary | ICD-10-CM

## 2025-04-04 DIAGNOSIS — M54.41 CHRONIC BILATERAL LOW BACK PAIN WITH BILATERAL SCIATICA: Primary | ICD-10-CM

## 2025-04-04 DIAGNOSIS — M79.605 PAIN IN LEFT LEG: ICD-10-CM

## 2025-04-04 DIAGNOSIS — M54.42 CHRONIC BILATERAL LOW BACK PAIN WITH BILATERAL SCIATICA: Primary | ICD-10-CM

## 2025-04-04 PROCEDURE — 97010 HOT OR COLD PACKS THERAPY: CPT | Mod: CQ

## 2025-04-04 PROCEDURE — 97140 MANUAL THERAPY 1/> REGIONS: CPT | Mod: CQ

## 2025-04-04 PROCEDURE — 97110 THERAPEUTIC EXERCISES: CPT | Mod: CQ

## 2025-04-04 PROCEDURE — 97014 ELECTRIC STIMULATION THERAPY: CPT | Mod: CQ

## 2025-04-07 ENCOUNTER — CLINICAL SUPPORT (OUTPATIENT)
Dept: REHABILITATION | Facility: HOSPITAL | Age: 65
End: 2025-04-07
Payer: COMMERCIAL

## 2025-04-07 DIAGNOSIS — G89.29 CHRONIC BILATERAL LOW BACK PAIN WITH BILATERAL SCIATICA: Primary | ICD-10-CM

## 2025-04-07 DIAGNOSIS — M54.51 VERTEBROGENIC LOW BACK PAIN: ICD-10-CM

## 2025-04-07 DIAGNOSIS — M54.41 CHRONIC BILATERAL LOW BACK PAIN WITH BILATERAL SCIATICA: Primary | ICD-10-CM

## 2025-04-07 DIAGNOSIS — M54.42 CHRONIC BILATERAL LOW BACK PAIN WITH BILATERAL SCIATICA: Primary | ICD-10-CM

## 2025-04-07 PROCEDURE — 97530 THERAPEUTIC ACTIVITIES: CPT | Mod: CQ

## 2025-04-07 PROCEDURE — 97112 NEUROMUSCULAR REEDUCATION: CPT | Mod: CQ

## 2025-04-07 PROCEDURE — 97110 THERAPEUTIC EXERCISES: CPT | Mod: CQ

## 2025-04-07 NOTE — PROGRESS NOTES
"  Outpatient Rehab    Physical Therapy Visit    Patient Name: Philomena Gonzalez  MRN: [id number]  YOB: 1960  Encounter Date: 4/7/2025    Therapy Diagnosis:   Encounter Diagnoses   Name Primary?    Chronic bilateral low back pain with bilateral sciatica Yes    Vertebrogenic low back pain      Physician: Dang Patel FNP    Physician Orders: Eval and Treat  Medical Diagnosis: Sacroiliac pain    Visit # / Visits Authorized:  7 / 12  Insurance Authorization Period: 3/24/2025 to 3/5/2027  Date of Evaluation: 3/24/2025  Plan of Care Certification: 3/24/2025 to 4/18/2025  FOTO due next session     PT/PTA:     Number of PTA visits since last PT visit: 4/5  Time In: 1010   Time Out: 1055  Total Time: 45   Total Billable Time: 45    FOTO:  Intake Score:  %  Survey Score 1:  %  Survey Score 2:  %         Subjective   Pain as noted in R low back.  Pain reported as 4/10. low back, B SIJ, BLEs    Objective            Treatment:  Therapeutic Exercise  TE 1: Bike 8 minutes  TE 2: standing HS stretch at steps 3 x 30" each LE  TE 3: double knee to chest, 3x20 s/h  TE 4: Supine LTRs w/ swiss ball under BLEs and holding smaller ball in BUEs for upper trunk rotation x 20 reps  TE 5: wedge B calf stretch x 2 min  TE 6: standing R ITB/QL stretch, 3x20 s/h  TE 7: L sidelying R QL stretch over red bolster, 3x20 s/h w/ manual overpressure  TE 8: fire hydrants in quadruped x 10 reps each  Balance/Neuromuscular Re-Education  NMR 1: bridging w/ BLE's over swiss ball  NMR 2: SIJ/hip muscle energy at steps, R and L sides, 3x10 s/h each  NMR 3: half kneeling on low mat table for hip flexor stretching along w/ SIJ mobility  Therapeutic Activity  TA 1: sit to stand x 20  reps  TA 2: 6" step ups, fwd and lateral, BLE x 10 reps each    Time Entry(in minutes):  Neuromuscular Re-Education Time Entry: 15  Therapeutic Activity Time Entry: 10  Therapeutic Exercise Time Entry: 20    Assessment & Plan   Assessment: Pt reports feeling better " w/ no pain at treatment end  Evaluation/Treatment Tolerance: Patient tolerated treatment well    Patient will continue to benefit from skilled outpatient physical therapy to address the deficits listed in the problem list box on initial evaluation, provide pt/family education and to maximize pt's level of independence in the home and community environment.         Plan: Continue per POC and progress as pt able    Goals:   Active       Long Term Goals        Patient will report decreased pain at worst to less than or equal to 4/10 for improved quality of life.        Start:  03/24/25    Expected End:  04/18/25            Patient will have increased FOTO score to greater than or equal to 55% indicating increased function.        Start:  03/24/25    Expected End:  04/18/25            Patient will have increased B hip strength to greater than or equal to 4+/5.       Start:  03/24/25    Expected End:  04/18/25            Patient will report decreased frequency of B lower extremity radicular pain by 40% indicating improved quality of life.        Start:  03/24/25    Expected End:  04/18/25               Short Term Goals        Patient will independently complete Home exercise program with correct form.         Start:  03/24/25    Expected End:  04/18/25            Patient will report decreased pain at worst to less than or equal to 5/10 for improved quality of life.        Start:  03/24/25    Expected End:  04/18/25                Shelly Cavazos, PTA

## 2025-04-10 ENCOUNTER — CLINICAL SUPPORT (OUTPATIENT)
Dept: REHABILITATION | Facility: HOSPITAL | Age: 65
End: 2025-04-10
Payer: COMMERCIAL

## 2025-04-10 DIAGNOSIS — M54.42 CHRONIC BILATERAL LOW BACK PAIN WITH BILATERAL SCIATICA: Primary | ICD-10-CM

## 2025-04-10 DIAGNOSIS — M54.41 CHRONIC BILATERAL LOW BACK PAIN WITH BILATERAL SCIATICA: Primary | ICD-10-CM

## 2025-04-10 DIAGNOSIS — G89.29 CHRONIC BILATERAL LOW BACK PAIN WITH BILATERAL SCIATICA: Primary | ICD-10-CM

## 2025-04-10 DIAGNOSIS — M54.51 VERTEBROGENIC LOW BACK PAIN: ICD-10-CM

## 2025-04-10 PROCEDURE — 97110 THERAPEUTIC EXERCISES: CPT | Mod: CQ

## 2025-04-10 PROCEDURE — 97112 NEUROMUSCULAR REEDUCATION: CPT | Mod: CQ

## 2025-04-10 PROCEDURE — 97530 THERAPEUTIC ACTIVITIES: CPT | Mod: CQ

## 2025-04-10 NOTE — PROGRESS NOTES
"  Outpatient Rehab    Physical Therapy Visit    Patient Name: Philomena Gonzalez  MRN: [id number]  YOB: 1960  Encounter Date: 4/10/2025    Therapy Diagnosis:   Encounter Diagnoses   Name Primary?    Chronic bilateral low back pain with bilateral sciatica Yes    Vertebrogenic low back pain      Physician: Dang Patel FNP    Physician Orders: Eval and Treat  Medical Diagnosis: Sacroiliac pain    Visit # / Visits Authorized:  8 / 12  Insurance Authorization Period: 3/24/2025 to 3/5/2027  Date of Evaluation: 3/24/2025  Plan of Care Certification: 3/24/2025 to 4/18/2025  FOTO due next session     PT/PTA:     Number of PTA visits since last PT visit: 5/5  Time In: 0845   Time Out: 0930  Total Time: 45   Total Billable Time: 45    FOTO:  Intake Score:  %  Survey Score 1:  %  Survey Score 2:  %         Subjective   pain as noted; after last session, states pain stayed low for a brief time.  Pain reported as 2/10. low back, B SIJ, BLEs    Objective            Treatment:  Therapeutic Exercise  TE 1: Bike 6 minutes  TE 2: standing HS stretch at steps 3 x 30" each LE  TE 3: double knee to chest, 3x20 s/h  TE 4: Supine LTRs w/ swiss ball  x 20 reps  TE 5: wedge B calf stretch x 2 min  TE 6: standing R ITB/QL stretch, 3x20 s/h  TE 7: L sidelying R QL stretch over red bolster, 3x20 s/h w/ manual overpressure (Not today)  TE 8: fire hydrants in quadruped 2 x 10 reps each  Balance/Neuromuscular Re-Education  NMR 1: bridging w/ BLE's over swiss ball  NMR 2: SIJ/hip muscle energy at steps, R and L sides, 3x10 s/h each  NMR 3: half kneeling on low mat table for hip flexor stretching along w/ SIJ mobility  Therapeutic Activity  TA 1: sit to stand x 20  reps, no BUE support  TA 2: 6" step ups, fwd and lateral, BLE x 10 reps each    Time Entry(in minutes):  Neuromuscular Re-Education Time Entry: 15  Therapeutic Activity Time Entry: 10  Therapeutic Exercise Time Entry: 20    Assessment & Plan   Assessment: About the same " post treatment  Evaluation/Treatment Tolerance: Patient tolerated treatment well    Patient will continue to benefit from skilled outpatient physical therapy to address the deficits listed in the problem list box on initial evaluation, provide pt/family education and to maximize pt's level of independence in the home and community environment.        Plan: Continue per POC and progress as pt able    Goals:   Active       Long Term Goals        Patient will report decreased pain at worst to less than or equal to 4/10 for improved quality of life.        Start:  03/24/25    Expected End:  04/18/25            Patient will have increased FOTO score to greater than or equal to 55% indicating increased function.        Start:  03/24/25    Expected End:  04/18/25            Patient will have increased B hip strength to greater than or equal to 4+/5.       Start:  03/24/25    Expected End:  04/18/25            Patient will report decreased frequency of B lower extremity radicular pain by 40% indicating improved quality of life.        Start:  03/24/25    Expected End:  04/18/25               Short Term Goals        Patient will independently complete Home exercise program with correct form.         Start:  03/24/25    Expected End:  04/18/25            Patient will report decreased pain at worst to less than or equal to 5/10 for improved quality of life.        Start:  03/24/25    Expected End:  04/18/25                Shelly Cavazos, PTA

## 2025-04-11 ENCOUNTER — CLINICAL SUPPORT (OUTPATIENT)
Dept: REHABILITATION | Facility: HOSPITAL | Age: 65
End: 2025-04-11
Payer: COMMERCIAL

## 2025-04-11 DIAGNOSIS — M79.605 PAIN IN LEFT LEG: ICD-10-CM

## 2025-04-11 DIAGNOSIS — M54.42 CHRONIC BILATERAL LOW BACK PAIN WITH BILATERAL SCIATICA: Primary | ICD-10-CM

## 2025-04-11 DIAGNOSIS — G89.29 CHRONIC BILATERAL LOW BACK PAIN WITH BILATERAL SCIATICA: Primary | ICD-10-CM

## 2025-04-11 DIAGNOSIS — M54.41 CHRONIC BILATERAL LOW BACK PAIN WITH BILATERAL SCIATICA: Primary | ICD-10-CM

## 2025-04-11 DIAGNOSIS — M79.604 PAIN IN RIGHT LEG: ICD-10-CM

## 2025-04-11 PROCEDURE — 97110 THERAPEUTIC EXERCISES: CPT

## 2025-04-11 PROCEDURE — 97112 NEUROMUSCULAR REEDUCATION: CPT

## 2025-04-11 NOTE — PROGRESS NOTES
"  Outpatient Rehab    Physical Therapy Visit    Patient Name: Philomena Gonzalez  MRN: [id number]  YOB: 1960  Encounter Date: 2025    Therapy Diagnosis:   Encounter Diagnoses   Name Primary?    Chronic bilateral low back pain with bilateral sciatica Yes    Pain in right leg     Pain in left leg      Physician: Dang Patel FNP    Physician Orders: Eval and Treat  Medical Diagnosis: Sacroiliac pain    Visit # / Visits Authorized:    Insurance Authorization Period: 3/24/2025 to 3/5/2027  Date of Evaluation: 3/24/2025  Plan of Care Certification: 3/24/2025 to 2025  FOTO due next session     PT/PTA: PT   Number of PTA visits since last PT visit:0  Time In: 0845   Time Out: 10  Total Time: 25   Total Billable Time: 25    FOTO:  Intake Score:  %  Survey Score 1:  %  Survey Score 2:  %         Subjective   Pt reports that yesterday she went to a play at the [employer] yesterday and sat on hard bleachers for about 2 hours and then went home and hosed off her porches of all the pollen. She says that her back began to flare up during the play and that it was made worse by doing the house work..         Objective            Treatment:  Therapeutic Exercise  TE 1: Bike 6 minutes  TE 2: child's pose, knees wide and feet together: 5 x 15 sec hold  TE 3: single knee to chest: 3 x 30 sec hold, each LE  TE 4: piriformis stretch: 3 x 30 sec hold  Balance/Neuromuscular Re-Education  NMR 1: TrA activation, hooklying ("pull belly button toward plinth without pelvic tilt"): 20 x 3 sec hold  NMR 2: TrA + hooklying marchin  NMR 3: TrA + SLR: 20, each LE  NMR 4: TrA + bear plank: 15 x 3 sec hold  NMR 5: Pallof press: 20 each way, double red tube    Time Entry(in minutes):  Neuromuscular Re-Education Time Entry: 15  Therapeutic Exercise Time Entry: 10    Assessment & Plan   Assessment: Pt's pain was flared up today, so kep session shorter and focused more on neutral spine core activities. Pt tolerated " treatment well with no increase in lower back pain except with attempted TrA and bridge. So, held on the bridges. Other TrA activities were performed well with no increase in pain in the lower back. Pt declines modalities post treatment today. Will progress as able and as tolerated in coming visits.       Patient will continue to benefit from skilled outpatient physical therapy to address the deficits listed in the problem list box on initial evaluation, provide pt/family education and to maximize pt's level of independence in the home and community environment.     Patient's spiritual, cultural, and educational needs considered and patient agreeable to plan of care and goals.           Plan: Continue per POC and progress as pt able    Goals:   Active       Long Term Goals        Patient will report decreased pain at worst to less than or equal to 4/10 for improved quality of life.        Start:  03/24/25    Expected End:  04/18/25            Patient will have increased FOTO score to greater than or equal to 55% indicating increased function.        Start:  03/24/25    Expected End:  04/18/25            Patient will have increased B hip strength to greater than or equal to 4+/5.       Start:  03/24/25    Expected End:  04/18/25            Patient will report decreased frequency of B lower extremity radicular pain by 40% indicating improved quality of life.        Start:  03/24/25    Expected End:  04/18/25               Short Term Goals        Patient will independently complete Home exercise program with correct form.         Start:  03/24/25    Expected End:  04/18/25            Patient will report decreased pain at worst to less than or equal to 5/10 for improved quality of life.        Start:  03/24/25    Expected End:  04/18/25                Danilo Roman, PT, DPT  4/11/2025

## 2025-04-15 ENCOUNTER — DOCUMENTATION ONLY (OUTPATIENT)
Dept: REHABILITATION | Facility: HOSPITAL | Age: 65
End: 2025-04-15
Payer: COMMERCIAL

## 2025-04-15 PROBLEM — M54.41 CHRONIC BILATERAL LOW BACK PAIN WITH BILATERAL SCIATICA: Status: RESOLVED | Noted: 2023-04-03 | Resolved: 2025-04-15

## 2025-04-15 PROBLEM — M79.604 PAIN IN RIGHT LEG: Status: RESOLVED | Noted: 2025-03-28 | Resolved: 2025-04-15

## 2025-04-15 PROBLEM — M79.605 PAIN IN LEFT LEG: Status: RESOLVED | Noted: 2025-03-28 | Resolved: 2025-04-15

## 2025-04-15 PROBLEM — M54.42 CHRONIC BILATERAL LOW BACK PAIN WITH BILATERAL SCIATICA: Status: RESOLVED | Noted: 2023-04-03 | Resolved: 2025-04-15

## 2025-04-15 PROBLEM — G89.29 CHRONIC BILATERAL LOW BACK PAIN WITH BILATERAL SCIATICA: Status: RESOLVED | Noted: 2023-04-03 | Resolved: 2025-04-15

## 2025-04-15 NOTE — PROGRESS NOTES
[facility]  Physical Therapy Discharge Note    Patient Name: Philomena Gonzalez  MRN: [id number]  YOB: 1960     Therapy Diagnosis:        Encounter Diagnoses   Name Primary?    Chronic bilateral low back pain with bilateral sciatica Yes    Pain in right leg      Pain in left leg        Physician: Dang Patel FNP     Physician Orders: Eval and Treat  Medical Diagnosis: Sacroiliac pain      Date of Last visit: 4/11/2025  Total Visits Received: 9    ASSESSMENT      Pt is requesting cancellation of all her remaining PT appointments. At this time, we have cancelled all pt's upcoming PT appointments and are discharging at this time. Multiple attempts have been made to reach out to pt to confirm DC, but no answer on phone calls and no return calls.     Discharge reason: Patient requested discharge    Discharge FOTO Score: NA    Goals: not met    PLAN   This patient is discharged from Physical Therapy      Danilo Roman, PT, DPT   4/15/2025

## 2025-05-28 ENCOUNTER — PATIENT MESSAGE (OUTPATIENT)
Dept: ADMINISTRATIVE | Facility: HOSPITAL | Age: 65
End: 2025-05-28
Payer: COMMERCIAL

## 2025-05-29 DIAGNOSIS — Z12.11 SCREENING FOR COLON CANCER: ICD-10-CM

## 2025-08-06 ENCOUNTER — RESULTS FOLLOW-UP (OUTPATIENT)
Dept: PRIMARY CARE CLINIC | Facility: CLINIC | Age: 65
End: 2025-08-06
Payer: COMMERCIAL

## 2025-08-06 DIAGNOSIS — F32.A DEPRESSION, UNSPECIFIED DEPRESSION TYPE: ICD-10-CM

## 2025-08-18 RX ORDER — FLUOXETINE 20 MG/1
20 CAPSULE ORAL EVERY MORNING
Qty: 90 CAPSULE | Refills: 3 | Status: SHIPPED | OUTPATIENT
Start: 2025-08-18